# Patient Record
Sex: MALE | Race: WHITE | URBAN - METROPOLITAN AREA
[De-identification: names, ages, dates, MRNs, and addresses within clinical notes are randomized per-mention and may not be internally consistent; named-entity substitution may affect disease eponyms.]

---

## 2017-09-06 ENCOUNTER — ALLSCRIPTS OFFICE VISIT (OUTPATIENT)
Dept: OTHER | Facility: OTHER | Age: 32
End: 2017-09-06

## 2017-09-06 DIAGNOSIS — E78.5 HYPERLIPIDEMIA: ICD-10-CM

## 2017-10-25 ENCOUNTER — ALLSCRIPTS OFFICE VISIT (OUTPATIENT)
Dept: OTHER | Facility: OTHER | Age: 32
End: 2017-10-25

## 2017-10-26 NOTE — PROGRESS NOTES
Assessment  1  Benign hypertension (401 1) (I10)   2  Hyperlipidemia (272 4) (E78 5)    Plan  Benign hypertension    · Follow-up visit in 6 months Evaluation and Treatment  Follow-up  Status: Hold For - Scheduling   Requested for: [id number]    Discussion/Summary  Discussion Summary:   Pressure is controlled  Less urgency to get the blood work since he is off the cholesterol medicine  Instructed them again to call if he gets the insurance so we can do a full panel of blood work for his blood pressure  Otherwise, follow-up in 6 months  Counseling Documentation With Imm: The patient was counseled regarding instructions for management,-- impressions  Medication SE Review and Pt Understands Tx: Possible side effects of new medications were reviewed with the patient/guardian today  The treatment plan was reviewed with the patient/guardian  The patient/guardian understands and agrees with the treatment plan   Self Referrals:   Self Referrals: No      Chief Complaint  Chief Complaint Free Text Note Form: Patient returns today for a routine follow up  History of Present Illness  HPI: Patient was in today for follow-up  His blood pressure is controlled on the medication  Notes no side effects  His girlfriend reports that they're still waiting for his insurance to go through  He did see the neurologist and his cholesterol medicine and blood thinner were stopped  Continues to make progress with his rehabilitation  Was unable to get the blood work because of his lack of insurance  Review of Systems  Complete-Male:   Cardiovascular: no chest pain  Respiratory: no shortness of breath  Active Problems  1  Benign hypertension (401 1) (I10)   2  Hyperlipidemia (272 4) (E78 5)   3  Vertebral artery dissection (443 24) (I89 74)    Past Medical History  1  Denied: History of mental disorder  Active Problems And Past Medical History Reviewed:    The active problems and past medical history were reviewed and updated today       Surgical History  1  Denied: History Of Prior Surgery    Family History  Father    1  Family history of hyperlipidemia (V18 19) (Z83 49)    Social History   · Daily caffeine consumption   · Never smoker   · No illicit drug use   · Occasional alcohol use   · Single    Current Meds   1  AmLODIPine Besylate 5 MG Oral Tablet; TAKE 1 TABLET DAILY  Requested for: [id number]; Last   [id number] Ordered  Medication List Reviewed: The medication list was reviewed and updated today  Allergies  1  No Known Drug Allergies    Vitals  Vital Signs    Recorded: [id number] 06:03PM   Temperature 97 7 F   Heart Rate 73   Systolic 503   Diastolic 68   Height 6 ft 1 in   Weight 209 lb    BMI Calculated 27 57   BSA Calculated 2 19   O2 Saturation 97     Physical Exam    Constitutional   General appearance: No acute distress, well appearing and well nourished  Pulmonary   Respiratory effort: No increased work of breathing or signs of respiratory distress  Auscultation of lungs: Clear to auscultation, equal breath sounds bilaterally, no wheezes, no rales, no rhonci  Cardiovascular   Auscultation of heart: Normal rate and rhythm, normal S1 and S2, without murmurs      Examination of extremities for edema and/or varicosities: Normal     Psychiatric   Orientation to person, place and time: Normal     Mood and affect: Normal          Signatures   Electronically signed by : Annie Michelle MD; Oct 25 2017  6:22PM EST                       (Author)

## 2018-01-12 VITALS
BODY MASS INDEX: 27.77 KG/M2 | HEIGHT: 73 IN | OXYGEN SATURATION: 98 % | SYSTOLIC BLOOD PRESSURE: 122 MMHG | RESPIRATION RATE: 18 BRPM | HEART RATE: 86 BPM | WEIGHT: 209.5 LBS | DIASTOLIC BLOOD PRESSURE: 76 MMHG

## 2018-01-14 VITALS
SYSTOLIC BLOOD PRESSURE: 102 MMHG | DIASTOLIC BLOOD PRESSURE: 68 MMHG | WEIGHT: 209 LBS | OXYGEN SATURATION: 97 % | HEIGHT: 73 IN | BODY MASS INDEX: 27.7 KG/M2 | TEMPERATURE: 97.7 F | HEART RATE: 73 BPM

## 2023-02-10 ENCOUNTER — OFFICE VISIT (OUTPATIENT)
Dept: INTERNAL MEDICINE CLINIC | Facility: CLINIC | Age: 38
End: 2023-02-10

## 2023-02-10 VITALS
RESPIRATION RATE: 18 BRPM | OXYGEN SATURATION: 93 % | BODY MASS INDEX: 29.18 KG/M2 | HEART RATE: 96 BPM | HEIGHT: 73 IN | WEIGHT: 220.2 LBS | TEMPERATURE: 97.3 F | SYSTOLIC BLOOD PRESSURE: 126 MMHG | DIASTOLIC BLOOD PRESSURE: 84 MMHG

## 2023-02-10 DIAGNOSIS — Z11.4 SCREENING FOR HIV WITHOUT PRESENCE OF RISK FACTORS: ICD-10-CM

## 2023-02-10 DIAGNOSIS — F32.A DEPRESSION, UNSPECIFIED DEPRESSION TYPE: ICD-10-CM

## 2023-02-10 DIAGNOSIS — I10 PRIMARY HYPERTENSION: ICD-10-CM

## 2023-02-10 DIAGNOSIS — F51.04 PSYCHOPHYSIOLOGICAL INSOMNIA: ICD-10-CM

## 2023-02-10 DIAGNOSIS — F10.10 ALCOHOL ABUSE: ICD-10-CM

## 2023-02-10 DIAGNOSIS — Z11.59 ENCOUNTER FOR HEPATITIS C SCREENING TEST FOR LOW RISK PATIENT: ICD-10-CM

## 2023-02-10 DIAGNOSIS — Z76.89 ESTABLISHING CARE WITH NEW DOCTOR, ENCOUNTER FOR: Primary | ICD-10-CM

## 2023-02-10 DIAGNOSIS — E78.2 MIXED HYPERLIPIDEMIA: ICD-10-CM

## 2023-02-10 RX ORDER — ATENOLOL 50 MG/1
50 TABLET ORAL DAILY
COMMUNITY
End: 2023-02-10 | Stop reason: SDUPTHER

## 2023-02-10 RX ORDER — ATORVASTATIN CALCIUM 40 MG/1
40 TABLET, FILM COATED ORAL DAILY
Qty: 90 TABLET | Refills: 1 | Status: SHIPPED | OUTPATIENT
Start: 2023-02-10

## 2023-02-10 RX ORDER — ATORVASTATIN CALCIUM 40 MG/1
40 TABLET, FILM COATED ORAL DAILY
COMMUNITY
End: 2023-02-10 | Stop reason: SDUPTHER

## 2023-02-10 RX ORDER — ATENOLOL 50 MG/1
50 TABLET ORAL DAILY
Qty: 90 TABLET | Refills: 1 | Status: SHIPPED | OUTPATIENT
Start: 2023-02-10

## 2023-02-10 RX ORDER — AMLODIPINE BESYLATE 5 MG/1
5 TABLET ORAL DAILY
Qty: 90 TABLET | Refills: 1 | Status: SHIPPED | OUTPATIENT
Start: 2023-02-10

## 2023-02-10 RX ORDER — AMLODIPINE BESYLATE 5 MG/1
1 TABLET ORAL DAILY
COMMUNITY
End: 2023-02-10 | Stop reason: SDUPTHER

## 2023-02-10 RX ORDER — TRAZODONE HYDROCHLORIDE 50 MG/1
50 TABLET ORAL
Qty: 90 TABLET | Refills: 3 | Status: SHIPPED | OUTPATIENT
Start: 2023-02-10

## 2023-02-10 RX ORDER — ESCITALOPRAM OXALATE 5 MG/1
5 TABLET ORAL DAILY
Qty: 30 TABLET | Refills: 5 | Status: SHIPPED | OUTPATIENT
Start: 2023-02-10

## 2023-02-10 NOTE — PROGRESS NOTES
Assessment/Plan:     Jocelyne Najjar is here to establish care; he is not working and has a live in girlfriend; from [place]; has a [address] for h/o ? Cerebellar stroke about 4-5 years ago, HTN, HLD, and alcohol abuse  He requires medication refills  Reports symptoms of depression that started shortly after his stroke like difficulty sleeping and irritability; was on therapy in the past but it did not help; will try lexapro 5 mg and trazodone for sleep; f/u in 1 month  H/o alcohol abuse; he stopped cold turkey and had seizure; not interested in rehab or AA meetings  Quality Measures:     BMI Counseling: Body mass index is 29 05 kg/m²  The BMI is above normal  Nutrition recommendations include decreasing portion sizes and encouraging healthy choices of fruits and vegetables  Exercise recommendations include moderate physical activity 150 minutes/week  Rationale for BMI follow-up plan is due to patient being overweight or obese  Depression Screening and Follow-up Plan: Patient's depression screening was positive with a PHQ-2 score of 6  Their PHQ-9 score was 11  Patient assessed for underlying major depression  Brief counseling provided and recommend additional follow-up/re-evaluation next office visit  Return in about 4 weeks (around 3/10/2023) for Annual physical     No problem-specific Assessment & Plan notes found for this encounter  Diagnoses and all orders for this visit:    Establishing care with new doctor, encounter for    Primary hypertension  -     amLODIPine (NORVASC) 5 mg tablet; Take 1 tablet (5 mg total) by mouth daily  -     atenolol (TENORMIN) 50 mg tablet; Take 1 tablet (50 mg total) by mouth daily  -     CBC and differential; Future  -     Comprehensive metabolic panel; Future  -     TSH, 3rd generation with Free T4 reflex;  Future  -     CBC and differential  -     Comprehensive metabolic panel  -     TSH, 3rd generation with Free T4 reflex    Mixed hyperlipidemia  -     atorvastatin (LIPITOR) 40 mg tablet; Take 1 tablet (40 mg total) by mouth daily  -     Hemoglobin A1C; Future  -     Lipid Panel with Direct LDL reflex; Future  -     Lipid Panel with Direct LDL reflex    Depression, unspecified depression type  -     escitalopram (LEXAPRO) 5 mg tablet; Take 1 tablet (5 mg total) by mouth daily    Psychophysiological insomnia  -     traZODone (DESYREL) 50 mg tablet; Take 1 tablet (50 mg total) by mouth daily at bedtime    Screening for HIV without presence of risk factors  -     HIV-1 RNA, quantitative, PCR; Future  -     HIV-1 RNA, quantitative, PCR    Encounter for hepatitis C screening test for low risk patient  -     Hepatitis C antibody; Future    Alcohol abuse  -     Vitamin B12; Future  -     Folate; Future  -     Vitamin B12  -     Folate    Other orders  -     Discontinue: amLODIPine (NORVASC) 5 mg tablet; Take 1 tablet by mouth daily  -     Discontinue: atorvastatin (LIPITOR) 40 mg tablet; Take 40 mg by mouth daily  -     Discontinue: atenolol (TENORMIN) 50 mg tablet; Take 50 mg by mouth daily          Subjective:      Patient ID: Grover Burden is a 45 y o  male  Here to establish care        ALLERGIES:  No Known Allergies    CURRENT MEDICATIONS:    Current Outpatient Medications:   •  amLODIPine (NORVASC) 5 mg tablet, Take 1 tablet (5 mg total) by mouth daily, Disp: 90 tablet, Rfl: 1  •  atenolol (TENORMIN) 50 mg tablet, Take 1 tablet (50 mg total) by mouth daily, Disp: 90 tablet, Rfl: 1  •  atorvastatin (LIPITOR) 40 mg tablet, Take 1 tablet (40 mg total) by mouth daily, Disp: 90 tablet, Rfl: 1  •  escitalopram (LEXAPRO) 5 mg tablet, Take 1 tablet (5 mg total) by mouth daily, Disp: 30 tablet, Rfl: 5  •  traZODone (DESYREL) 50 mg tablet, Take 1 tablet (50 mg total) by mouth daily at bedtime, Disp: 90 tablet, Rfl: 3    ACTIVE PROBLEM LIST:  Patient Active Problem List   Diagnosis   • Mixed hyperlipidemia   • Depression   • Primary hypertension       PAST MEDICAL HISTORY:  Past Medical History:   Diagnosis Date   • Hyperlipidemia    • Hypertension    • Stroke [facility])        PAST SURGICAL HISTORY:  History reviewed  No pertinent surgical history  FAMILY HISTORY:  Family History   Problem Relation Age of Onset   • Lymphoma Mother    • Heart attack Mother    • Hypertension Mother    • Hypertension Father        SOCIAL HISTORY:  Social History     Socioeconomic History   • Marital status: Single     Spouse name: Not on file   • Number of children: Not on file   • Years of education: Not on file   • Highest education level: Not on file   Occupational History   • Not on file   Tobacco Use   • Smoking status: Never   • Smokeless tobacco: Current   Substance and Sexual Activity   • Alcohol use: Yes   • Drug use: Never   • Sexual activity: Not on file   Other Topics Concern   • Not on file   Social History Narrative   • Not on file     Social Determinants of Health     Financial Resource Strain: Not on file   Food Insecurity: Not on file   Transportation Needs: Not on file   Physical Activity: Not on file   Stress: Not on file   Social Connections: Not on file   Intimate Partner Violence: Not on file   Housing Stability: Not on file       Review of Systems   Psychiatric/Behavioral: Positive for decreased concentration, dysphoric mood and sleep disturbance  The patient is nervous/anxious  All other systems reviewed and are negative  Objective:  Vitals:    02/10/23 1411   BP: 126/84   BP Location: Right arm   Patient Position: Sitting   Cuff Size: Adult   Pulse: 96   Resp: 18   Temp: (!) 97 3 °F (36 3 °C)   TempSrc: Tympanic   SpO2: 93%   Weight: 99 9 kg (220 lb 3 2 oz)   Height: 6' 1" (1 854 m)     Body mass index is 29 05 kg/m²  Physical Exam  Vitals and nursing note reviewed  Constitutional:       Appearance: Normal appearance  HENT:      Head: Normocephalic and atraumatic  Right Ear: There is impacted cerumen  Left Ear: There is impacted cerumen     Cardiovascular: Rate and Rhythm: Normal rate and regular rhythm  Heart sounds: Normal heart sounds  Pulmonary:      Effort: Pulmonary effort is normal       Breath sounds: Normal breath sounds  Musculoskeletal:         General: Normal range of motion  Cervical back: Normal range of motion  Lymphadenopathy:      Cervical: No cervical adenopathy  Skin:     General: Skin is warm and dry  Neurological:      General: No focal deficit present  Mental Status: He is alert and oriented to person, place, and time  Mental status is at baseline  Gait: Gait abnormal       Comments: Wide based gait   Psychiatric:         Attention and Perception: Attention normal          Mood and Affect: Mood normal          Speech: Speech normal            RESULTS:    No results found for this or any previous visit (from the past 1008 hour(s))  This note was created with voice recognition software  Phonic, grammatical and spelling errors may be present within the note as a result

## 2023-02-10 NOTE — PROGRESS NOTES
Assessment/Plan:      Quality Measures:     BMI Counseling: Body mass index is 29 05 kg/m²  The BMI is above normal  Nutrition recommendations include decreasing portion sizes and encouraging healthy choices of fruits and vegetables  Exercise recommendations include moderate physical activity 150 minutes/week  Rationale for BMI follow-up plan is due to patient being overweight or obese  Depression Screening and Follow-up Plan: Patient assessed for underlying major depression  Brief counseling provided and recommend additional follow-up/re-evaluation next office visit  Return in about 6 months (around 8/10/2023) for Annual physical     No problem-specific Assessment & Plan notes found for this encounter  Diagnoses and all orders for this visit:    Establishing care with new doctor, encounter for    Primary hypertension  -     amLODIPine (NORVASC) 5 mg tablet; Take 1 tablet (5 mg total) by mouth daily  -     atenolol (TENORMIN) 50 mg tablet; Take 1 tablet (50 mg total) by mouth daily    Mixed hyperlipidemia  -     atorvastatin (LIPITOR) 40 mg tablet; Take 1 tablet (40 mg total) by mouth daily    Other orders  -     Discontinue: amLODIPine (NORVASC) 5 mg tablet; Take 1 tablet by mouth daily  -     Discontinue: atorvastatin (LIPITOR) 40 mg tablet; Take 40 mg by mouth daily  -     Discontinue: atenolol (TENORMIN) 50 mg tablet; Take 50 mg by mouth daily          Subjective:      Patient ID: Samantha Kumar is a 45 y o  male  Here to establish care        ALLERGIES:  No Known Allergies    CURRENT MEDICATIONS:    Current Outpatient Medications:   •  amLODIPine (NORVASC) 5 mg tablet, Take 1 tablet (5 mg total) by mouth daily, Disp: 90 tablet, Rfl: 1  •  atenolol (TENORMIN) 50 mg tablet, Take 1 tablet (50 mg total) by mouth daily, Disp: 90 tablet, Rfl: 1  •  atorvastatin (LIPITOR) 40 mg tablet, Take 1 tablet (40 mg total) by mouth daily, Disp: 90 tablet, Rfl: 1    ACTIVE PROBLEM LIST:  There is no problem list on file for this patient  PAST MEDICAL HISTORY:  Past Medical History:   Diagnosis Date   • Hyperlipidemia    • Hypertension    • Stroke [facility])        PAST SURGICAL HISTORY:  History reviewed  No pertinent surgical history  FAMILY HISTORY:  Family History   Problem Relation Age of Onset   • Lymphoma Mother    • Heart attack Mother    • Hypertension Mother    • Hypertension Father        SOCIAL HISTORY:  Social History     Socioeconomic History   • Marital status: Single     Spouse name: Not on file   • Number of children: Not on file   • Years of education: Not on file   • Highest education level: Not on file   Occupational History   • Not on file   Tobacco Use   • Smoking status: Never   • Smokeless tobacco: Current   Substance and Sexual Activity   • Alcohol use: Yes   • Drug use: Never   • Sexual activity: Not on file   Other Topics Concern   • Not on file   Social History Narrative   • Not on file     Social Determinants of Health     Financial Resource Strain: Not on file   Food Insecurity: Not on file   Transportation Needs: Not on file   Physical Activity: Not on file   Stress: Not on file   Social Connections: Not on file   Intimate Partner Violence: Not on file   Housing Stability: Not on file       Review of Systems   Psychiatric/Behavioral: Positive for decreased concentration, dysphoric mood and sleep disturbance  All other systems reviewed and are negative  Objective:  Vitals:    02/10/23 1411   BP: 126/84   BP Location: Right arm   Patient Position: Sitting   Cuff Size: Adult   Pulse: 96   Resp: 18   Temp: (!) 97 3 °F (36 3 °C)   TempSrc: Tympanic   SpO2: 93%   Weight: 99 9 kg (220 lb 3 2 oz)   Height: 6' 1" (1 854 m)     Body mass index is 29 05 kg/m²  Physical Exam  Vitals and nursing note reviewed  Constitutional:       Appearance: Normal appearance  HENT:      Head: Normocephalic and atraumatic  Right Ear: There is impacted cerumen  Left Ear:  There is impacted cerumen  Cardiovascular:      Rate and Rhythm: Normal rate and regular rhythm  Heart sounds: Normal heart sounds  Pulmonary:      Effort: Pulmonary effort is normal       Breath sounds: Normal breath sounds  Abdominal:      General: Bowel sounds are normal  There is distension  Palpations: Abdomen is soft  Musculoskeletal:         General: Normal range of motion  Cervical back: Normal range of motion  Lymphadenopathy:      Cervical: No cervical adenopathy  Skin:     General: Skin is warm and dry  Neurological:      General: No focal deficit present  Mental Status: He is alert and oriented to person, place, and time  Mental status is at baseline  Psychiatric:         Mood and Affect: Mood normal            RESULTS:    No results found for this or any previous visit (from the past 1008 hour(s))  This note was created with voice recognition software  Phonic, grammatical and spelling errors may be present within the note as a result

## 2023-02-24 ENCOUNTER — APPOINTMENT (OUTPATIENT)
Dept: LAB | Facility: HOSPITAL | Age: 38
End: 2023-02-24

## 2023-02-24 DIAGNOSIS — E78.2 MIXED HYPERLIPIDEMIA: Primary | ICD-10-CM

## 2023-02-24 DIAGNOSIS — Z11.59 ENCOUNTER FOR HEPATITIS C SCREENING TEST FOR LOW RISK PATIENT: ICD-10-CM

## 2023-02-24 LAB
ALBUMIN SERPL BCP-MCNC: 2.7 G/DL (ref 3.5–5)
ALP SERPL-CCNC: 139 U/L (ref 46–116)
ALT SERPL W P-5'-P-CCNC: 82 U/L (ref 12–78)
ANION GAP SERPL CALCULATED.3IONS-SCNC: 12 MMOL/L (ref 4–13)
AST SERPL W P-5'-P-CCNC: 157 U/L (ref 5–45)
BASOPHILS # BLD AUTO: 0.13 THOUSANDS/ÂΜL (ref 0–0.1)
BASOPHILS NFR BLD AUTO: 2 % (ref 0–1)
BILIRUB SERPL-MCNC: 7.52 MG/DL (ref 0.2–1)
BUN SERPL-MCNC: 3 MG/DL (ref 5–25)
CALCIUM ALBUM COR SERPL-MCNC: 9.3 MG/DL (ref 8.3–10.1)
CALCIUM SERPL-MCNC: 8.3 MG/DL (ref 8.3–10.1)
CHLORIDE SERPL-SCNC: 97 MMOL/L (ref 96–108)
CHOLEST SERPL-MCNC: 75 MG/DL
CO2 SERPL-SCNC: 23 MMOL/L (ref 21–32)
CREAT SERPL-MCNC: 0.99 MG/DL (ref 0.6–1.3)
EOSINOPHIL # BLD AUTO: 0.02 THOUSAND/ÂΜL (ref 0–0.61)
EOSINOPHIL NFR BLD AUTO: 0 % (ref 0–6)
ERYTHROCYTE [DISTWIDTH] IN BLOOD BY AUTOMATED COUNT: 18.5 % (ref 11.6–15.1)
FOLATE SERPL-MCNC: 5.3 NG/ML (ref 3.1–17.5)
GFR SERPL CREATININE-BSD FRML MDRD: 96 ML/MIN/1.73SQ M
GLUCOSE P FAST SERPL-MCNC: 102 MG/DL (ref 65–99)
HCT VFR BLD AUTO: 44.1 % (ref 36.5–49.3)
HCV AB SER QL: NORMAL
HDLC SERPL-MCNC: 20 MG/DL
HGB BLD-MCNC: 15.7 G/DL (ref 12–17)
IMM GRANULOCYTES # BLD AUTO: 0.04 THOUSAND/UL (ref 0–0.2)
IMM GRANULOCYTES NFR BLD AUTO: 1 % (ref 0–2)
LDLC SERPL CALC-MCNC: 41 MG/DL (ref 0–100)
LYMPHOCYTES # BLD AUTO: 1.44 THOUSANDS/ÂΜL (ref 0.6–4.47)
LYMPHOCYTES NFR BLD AUTO: 17 % (ref 14–44)
MCH RBC QN AUTO: 33.1 PG (ref 26.8–34.3)
MCHC RBC AUTO-ENTMCNC: 35.6 G/DL (ref 31.4–37.4)
MCV RBC AUTO: 93 FL (ref 82–98)
MONOCYTES # BLD AUTO: 1.04 THOUSAND/ÂΜL (ref 0.17–1.22)
MONOCYTES NFR BLD AUTO: 12 % (ref 4–12)
NEUTROPHILS # BLD AUTO: 5.8 THOUSANDS/ÂΜL (ref 1.85–7.62)
NEUTS SEG NFR BLD AUTO: 68 % (ref 43–75)
NRBC BLD AUTO-RTO: 0 /100 WBCS
PLATELET # BLD AUTO: 102 THOUSANDS/UL (ref 149–390)
PMV BLD AUTO: 10.9 FL (ref 8.9–12.7)
POTASSIUM SERPL-SCNC: 3.2 MMOL/L (ref 3.5–5.3)
PROT SERPL-MCNC: 8.3 G/DL (ref 6.4–8.4)
RBC # BLD AUTO: 4.74 MILLION/UL (ref 3.88–5.62)
SODIUM SERPL-SCNC: 132 MMOL/L (ref 135–147)
TRIGL SERPL-MCNC: 72 MG/DL
TSH SERPL DL<=0.05 MIU/L-ACNC: 2.55 UIU/ML (ref 0.45–4.5)
VIT B12 SERPL-MCNC: 4579 PG/ML (ref 100–900)
WBC # BLD AUTO: 8.47 THOUSAND/UL (ref 4.31–10.16)

## 2023-02-25 LAB
EST. AVERAGE GLUCOSE BLD GHB EST-MCNC: 85 MG/DL
HBA1C MFR BLD: 4.6 %

## 2023-02-26 LAB
HIV1 RNA # SERPL NAA+PROBE: <20 COPIES/ML
HIV1 RNA SERPL NAA+PROBE-LOG#: NORMAL LOG10COPY/ML

## 2023-03-03 ENCOUNTER — APPOINTMENT (EMERGENCY)
Dept: CT IMAGING | Facility: HOSPITAL | Age: 38
End: 2023-03-03

## 2023-03-03 ENCOUNTER — HOSPITAL ENCOUNTER (INPATIENT)
Facility: HOSPITAL | Age: 38
LOS: 3 days | Discharge: HOME | End: 2023-03-06
Attending: EMERGENCY MEDICINE | Admitting: INTERNAL MEDICINE

## 2023-03-03 ENCOUNTER — APPOINTMENT (OUTPATIENT)
Dept: NON INVASIVE DIAGNOSTICS | Facility: HOSPITAL | Age: 38
End: 2023-03-03
Attending: INTERNAL MEDICINE

## 2023-03-03 DIAGNOSIS — K74.60 CIRRHOSIS (HCC): ICD-10-CM

## 2023-03-03 DIAGNOSIS — K72.90 DECOMPENSATED HEPATIC CIRRHOSIS (HCC): ICD-10-CM

## 2023-03-03 DIAGNOSIS — E87.1 HYPONATREMIA: ICD-10-CM

## 2023-03-03 DIAGNOSIS — F10.20 ALCOHOLISM (HCC): ICD-10-CM

## 2023-03-03 DIAGNOSIS — R18.8 ASCITES: Primary | ICD-10-CM

## 2023-03-03 DIAGNOSIS — E87.6 HYPOKALEMIA: ICD-10-CM

## 2023-03-03 DIAGNOSIS — K74.60 DECOMPENSATED HEPATIC CIRRHOSIS (HCC): ICD-10-CM

## 2023-03-03 LAB
ALBUMIN FLD-MCNC: <0.6 G/DL
ALBUMIN SERPL BCP-MCNC: 3.1 G/DL (ref 3.5–5)
ALP SERPL-CCNC: 108 U/L (ref 34–104)
ALT SERPL W P-5'-P-CCNC: 49 U/L (ref 7–52)
ANION GAP SERPL CALCULATED.3IONS-SCNC: 11 MMOL/L (ref 4–13)
APTT PPP: 38 SECONDS (ref 23–37)
AST SERPL W P-5'-P-CCNC: 82 U/L (ref 13–39)
ATRIAL RATE: 76 BPM
BASOPHILS # BLD AUTO: 0.09 THOUSANDS/ÂΜL (ref 0–0.1)
BASOPHILS NFR BLD AUTO: 1 % (ref 0–1)
BILIRUB SERPL-MCNC: 6.43 MG/DL (ref 0.2–1)
BUN SERPL-MCNC: 7 MG/DL (ref 5–25)
CALCIUM ALBUM COR SERPL-MCNC: 9.5 MG/DL (ref 8.3–10.1)
CALCIUM SERPL-MCNC: 8.8 MG/DL (ref 8.4–10.2)
CHLORIDE SERPL-SCNC: 97 MMOL/L (ref 96–108)
CO2 SERPL-SCNC: 20 MMOL/L (ref 21–32)
CREAT SERPL-MCNC: 0.87 MG/DL (ref 0.6–1.3)
EOSINOPHIL # BLD AUTO: 0.02 THOUSAND/ÂΜL (ref 0–0.61)
EOSINOPHIL NFR BLD AUTO: 0 % (ref 0–6)
ERYTHROCYTE [DISTWIDTH] IN BLOOD BY AUTOMATED COUNT: 17.7 % (ref 11.6–15.1)
EST. AVERAGE GLUCOSE BLD GHB EST-MCNC: 88 MG/DL
GFR SERPL CREATININE-BSD FRML MDRD: 109 ML/MIN/1.73SQ M
GLUCOSE FLD-MCNC: 121 MG/DL
GLUCOSE SERPL-MCNC: 126 MG/DL (ref 65–140)
HBA1C MFR BLD: 4.7 %
HCT VFR BLD AUTO: 44.1 % (ref 36.5–49.3)
HGB BLD-MCNC: 16.1 G/DL (ref 12–17)
HISTIOCYTES NFR FLD: 53 %
IMM GRANULOCYTES # BLD AUTO: 0.02 THOUSAND/UL (ref 0–0.2)
IMM GRANULOCYTES NFR BLD AUTO: 0 % (ref 0–2)
INR PPP: 1.8 (ref 0.84–1.19)
LDH FLD L TO P-CCNC: 63 U/L
LIPASE SERPL-CCNC: 188 U/L (ref 11–82)
LYMPHOCYTES # BLD AUTO: 1.29 THOUSANDS/ÂΜL (ref 0.6–4.47)
LYMPHOCYTES NFR BLD AUTO: 16 % (ref 14–44)
LYMPHOCYTES NFR BLD AUTO: 19 %
MAGNESIUM SERPL-MCNC: 1.9 MG/DL (ref 1.9–2.7)
MCH RBC QN AUTO: 33.9 PG (ref 26.8–34.3)
MCHC RBC AUTO-ENTMCNC: 36.5 G/DL (ref 31.4–37.4)
MCV RBC AUTO: 93 FL (ref 82–98)
MONO+MESO NFR FLD MANUAL: 3 %
MONOCYTES # BLD AUTO: 0.91 THOUSAND/ÂΜL (ref 0.17–1.22)
MONOCYTES NFR BLD AUTO: 11 % (ref 4–12)
MONOCYTES NFR BLD AUTO: 23 %
NEUTROPHILS # BLD AUTO: 5.69 THOUSANDS/ÂΜL (ref 1.85–7.62)
NEUTS SEG NFR BLD AUTO: 2 %
NEUTS SEG NFR BLD AUTO: 72 % (ref 43–75)
NRBC BLD AUTO-RTO: 0 /100 WBCS
P AXIS: 53 DEGREES
PLATELET # BLD AUTO: 136 THOUSANDS/UL (ref 149–390)
PMV BLD AUTO: 11 FL (ref 8.9–12.7)
POTASSIUM SERPL-SCNC: 2.9 MMOL/L (ref 3.5–5.3)
PR INTERVAL: 146 MS
PROT FLD-MCNC: <2 G/DL
PROT SERPL-MCNC: 8.5 G/DL (ref 6.4–8.4)
PROTHROMBIN TIME: 20.5 SECONDS (ref 11.6–14.5)
QRS AXIS: 13 DEGREES
QRSD INTERVAL: 106 MS
QT INTERVAL: 444 MS
QTC INTERVAL: 499 MS
RBC # BLD AUTO: 4.75 MILLION/UL (ref 3.88–5.62)
SODIUM 24H UR-SCNC: <10 MOL/L
SODIUM SERPL-SCNC: 128 MMOL/L (ref 135–147)
T WAVE AXIS: 62 DEGREES
TOTAL CELLS COUNTED SPEC: 100
VENTRICULAR RATE: 76 BPM
WBC # BLD AUTO: 8.02 THOUSAND/UL (ref 4.31–10.16)
WBC # FLD MANUAL: 64 /UL

## 2023-03-03 PROCEDURE — 0W9G3ZZ DRAINAGE OF PERITONEAL CAVITY, PERCUTANEOUS APPROACH: ICD-10-PCS | Performed by: INTERNAL MEDICINE

## 2023-03-03 RX ORDER — POTASSIUM CHLORIDE 14.9 MG/ML
20 INJECTION INTRAVENOUS
Status: COMPLETED | OUTPATIENT
Start: 2023-03-03 | End: 2023-03-03

## 2023-03-03 RX ORDER — POTASSIUM CHLORIDE 20 MEQ/1
40 TABLET, EXTENDED RELEASE ORAL 2 TIMES DAILY
Status: DISCONTINUED | OUTPATIENT
Start: 2023-03-03 | End: 2023-03-06

## 2023-03-03 RX ORDER — ACETAMINOPHEN 325 MG/1
650 TABLET ORAL EVERY 6 HOURS PRN
Status: DISCONTINUED | OUTPATIENT
Start: 2023-03-03 | End: 2023-03-06 | Stop reason: HOSPADM

## 2023-03-03 RX ORDER — ATORVASTATIN CALCIUM 40 MG/1
40 TABLET, FILM COATED ORAL DAILY
Status: DISCONTINUED | OUTPATIENT
Start: 2023-03-03 | End: 2023-03-06 | Stop reason: HOSPADM

## 2023-03-03 RX ORDER — POTASSIUM CHLORIDE 20 MEQ/1
40 TABLET, EXTENDED RELEASE ORAL ONCE
Status: COMPLETED | OUTPATIENT
Start: 2023-03-03 | End: 2023-03-03

## 2023-03-03 RX ORDER — FOLIC ACID 1 MG/1
1 TABLET ORAL DAILY
Status: DISCONTINUED | OUTPATIENT
Start: 2023-03-03 | End: 2023-03-06 | Stop reason: HOSPADM

## 2023-03-03 RX ORDER — ESCITALOPRAM OXALATE 10 MG/1
5 TABLET ORAL DAILY
Status: DISCONTINUED | OUTPATIENT
Start: 2023-03-03 | End: 2023-03-06 | Stop reason: HOSPADM

## 2023-03-03 RX ORDER — PREDNISOLONE SODIUM PHOSPHATE 15 MG/5ML
40 SOLUTION ORAL DAILY
Status: DISCONTINUED | OUTPATIENT
Start: 2023-03-03 | End: 2023-03-06 | Stop reason: HOSPADM

## 2023-03-03 RX ORDER — TRAZODONE HYDROCHLORIDE 50 MG/1
50 TABLET ORAL
Status: DISCONTINUED | OUTPATIENT
Start: 2023-03-03 | End: 2023-03-06 | Stop reason: HOSPADM

## 2023-03-03 RX ORDER — POTASSIUM CHLORIDE 29.8 MG/ML
40 INJECTION INTRAVENOUS ONCE
Status: DISCONTINUED | OUTPATIENT
Start: 2023-03-03 | End: 2023-03-03

## 2023-03-03 RX ORDER — LORAZEPAM 1 MG/1
1 TABLET ORAL ONCE
Status: COMPLETED | OUTPATIENT
Start: 2023-03-03 | End: 2023-03-03

## 2023-03-03 RX ORDER — LANOLIN ALCOHOL/MO/W.PET/CERES
100 CREAM (GRAM) TOPICAL DAILY
Status: DISCONTINUED | OUTPATIENT
Start: 2023-03-03 | End: 2023-03-06 | Stop reason: HOSPADM

## 2023-03-03 RX ORDER — NICOTINE 21 MG/24HR
1 PATCH, TRANSDERMAL 24 HOURS TRANSDERMAL DAILY
Status: DISCONTINUED | OUTPATIENT
Start: 2023-03-03 | End: 2023-03-06 | Stop reason: HOSPADM

## 2023-03-03 RX ORDER — HEPARIN SODIUM 5000 [USP'U]/ML
5000 INJECTION, SOLUTION INTRAVENOUS; SUBCUTANEOUS EVERY 8 HOURS SCHEDULED
Status: DISCONTINUED | OUTPATIENT
Start: 2023-03-03 | End: 2023-03-06 | Stop reason: HOSPADM

## 2023-03-03 RX ORDER — LORAZEPAM 1 MG/1
2 TABLET ORAL ONCE
Status: COMPLETED | OUTPATIENT
Start: 2023-03-03 | End: 2023-03-03

## 2023-03-03 RX ORDER — ONDANSETRON 2 MG/ML
4 INJECTION INTRAMUSCULAR; INTRAVENOUS EVERY 6 HOURS PRN
Status: DISCONTINUED | OUTPATIENT
Start: 2023-03-03 | End: 2023-03-06 | Stop reason: HOSPADM

## 2023-03-03 RX ORDER — ATENOLOL 50 MG/1
50 TABLET ORAL DAILY
Status: DISCONTINUED | OUTPATIENT
Start: 2023-03-03 | End: 2023-03-04

## 2023-03-03 RX ORDER — LIDOCAINE HYDROCHLORIDE 10 MG/ML
INJECTION, SOLUTION EPIDURAL; INFILTRATION; INTRACAUDAL; PERINEURAL AS NEEDED
Status: COMPLETED | OUTPATIENT
Start: 2023-03-03 | End: 2023-03-03

## 2023-03-03 RX ORDER — AMLODIPINE BESYLATE 5 MG/1
5 TABLET ORAL DAILY
Status: DISCONTINUED | OUTPATIENT
Start: 2023-03-03 | End: 2023-03-04

## 2023-03-03 RX ADMIN — POTASSIUM CHLORIDE 20 MEQ: 14.9 INJECTION, SOLUTION INTRAVENOUS at 16:01

## 2023-03-03 RX ADMIN — Medication 1 TABLET: at 17:22

## 2023-03-03 RX ADMIN — FOLIC ACID 1 MG: 1 TABLET ORAL at 17:21

## 2023-03-03 RX ADMIN — TRAZODONE HYDROCHLORIDE 50 MG: 50 TABLET ORAL at 22:22

## 2023-03-03 RX ADMIN — ATORVASTATIN CALCIUM 40 MG: 40 TABLET, FILM COATED ORAL at 17:21

## 2023-03-03 RX ADMIN — POTASSIUM CHLORIDE 20 MEQ: 14.9 INJECTION, SOLUTION INTRAVENOUS at 13:03

## 2023-03-03 RX ADMIN — LIDOCAINE HYDROCHLORIDE 5 ML: 10 INJECTION, SOLUTION EPIDURAL; INFILTRATION; INTRACAUDAL; PERINEURAL at 14:33

## 2023-03-03 RX ADMIN — LORAZEPAM 1 MG: 1 TABLET ORAL at 11:35

## 2023-03-03 RX ADMIN — ESCITALOPRAM OXALATE 5 MG: 10 TABLET ORAL at 17:21

## 2023-03-03 RX ADMIN — ATENOLOL 50 MG: 50 TABLET ORAL at 17:22

## 2023-03-03 RX ADMIN — HEPARIN SODIUM 5000 UNITS: 5000 INJECTION INTRAVENOUS; SUBCUTANEOUS at 22:22

## 2023-03-03 RX ADMIN — AMLODIPINE BESYLATE 5 MG: 5 TABLET ORAL at 17:22

## 2023-03-03 RX ADMIN — IOHEXOL 100 ML: 350 INJECTION, SOLUTION INTRAVENOUS at 12:34

## 2023-03-03 RX ADMIN — THIAMINE HCL TAB 100 MG 100 MG: 100 TAB at 17:21

## 2023-03-03 RX ADMIN — POTASSIUM CHLORIDE 40 MEQ: 1500 TABLET, EXTENDED RELEASE ORAL at 12:57

## 2023-03-03 RX ADMIN — POTASSIUM CHLORIDE 40 MEQ: 1500 TABLET, EXTENDED RELEASE ORAL at 17:21

## 2023-03-03 RX ADMIN — LORAZEPAM 2 MG: 1 TABLET ORAL at 18:36

## 2023-03-03 RX ADMIN — HEPARIN SODIUM 5000 UNITS: 5000 INJECTION INTRAVENOUS; SUBCUTANEOUS at 17:22

## 2023-03-03 NOTE — ED PROVIDER NOTES
History  Chief Complaint   Patient presents with   • Abnormal Lab     Pt states his pcp called him to come here for CT of his gall bladder, based on his blood work results from last week  Patient is a 51-year-old male past medical history of hyperlipidemia, hypertension, CVA, alcohol abuse presenting with abnormal labs  Patient had labs drawn 2/24 which showed mildly elevated LFTs and elevated bilirubin to 7  Patient states that his PCP told him to come in to get a CT of his gallbladder  Patient notes lower abdominal pain for the last week which has been coming and going, associated with diarrhea and intermittent vomiting, nonbloody  He states it is worse at night he has never had before does not take any medication for it  Denies any change to his baseline dizziness  Denies any fevers, urinary symptoms, chest pain, shortness of breath, rashes, vision changes  States that his last drink was 2 days ago and he has been trying to wean himself off alcohol  Has a history of withdrawal seizures  States he is having shakes currently  Prior to Admission Medications   Prescriptions Last Dose Informant Patient Reported? Taking? amLODIPine (NORVASC) 5 mg tablet   No No   Sig: Take 1 tablet (5 mg total) by mouth daily   atenolol (TENORMIN) 50 mg tablet   No No   Sig: Take 1 tablet (50 mg total) by mouth daily   atorvastatin (LIPITOR) 40 mg tablet   No No   Sig: Take 1 tablet (40 mg total) by mouth daily   escitalopram (LEXAPRO) 5 mg tablet   No No   Sig: Take 1 tablet (5 mg total) by mouth daily   traZODone (DESYREL) 50 mg tablet   No No   Sig: Take 1 tablet (50 mg total) by mouth daily at bedtime      Facility-Administered Medications: None       Past Medical History:   Diagnosis Date   • Hyperlipidemia    • Hypertension    • Stroke ([facility], [place] [address] )        No past surgical history on file      Family History   Problem Relation Age of Onset   • Lymphoma Mother    • Heart attack Mother    • Hypertension Mother    • Hypertension Father      I have reviewed and agree with the history as documented  E-Cigarette/Vaping     E-Cigarette/Vaping Substances     Social History     Tobacco Use   • Smoking status: Never   • Smokeless tobacco: Current   Substance Use Topics   • Alcohol use: Yes     Alcohol/week: 6 0 standard drinks     Types: 6 Cans of beer per week   • Drug use: Never       Review of Systems   All other systems reviewed and are negative  Physical Exam  Physical Exam  Vitals reviewed  Constitutional:       General: He is not in acute distress  Appearance: Normal appearance  He is not ill-appearing  HENT:      Mouth/Throat:      Mouth: Mucous membranes are moist    Eyes:      General: Scleral icterus present  Conjunctiva/sclera: Conjunctivae normal    Cardiovascular:      Rate and Rhythm: Normal rate and regular rhythm  Heart sounds: Normal heart sounds  Pulmonary:      Effort: Pulmonary effort is normal       Breath sounds: Normal breath sounds  Abdominal:      General: Abdomen is flat  There is distension  Palpations: Abdomen is soft  Tenderness: There is abdominal tenderness  Comments: Mild generalized tenderness without guarding   Musculoskeletal:         General: No swelling  Normal range of motion  Cervical back: Neck supple  Skin:     General: Skin is warm and dry  Neurological:      General: No focal deficit present  Mental Status: He is alert        Coordination: Coordination normal       Comments: Mild diffuse tremor   Psychiatric:         Mood and Affect: Mood normal          Vital Signs  ED Triage Vitals [03/03/23 1111]   Temperature Pulse Respirations Blood Pressure SpO2   98 3 °F (36 8 °C) 88 17 114/65 95 %      Temp Source Heart Rate Source Patient Position - Orthostatic VS BP Location FiO2 (%)   Temporal Monitor Sitting Left arm --      Pain Score       --           Vitals:    03/03/23 1111   BP: 114/65   Pulse: 88   Patient Position - Orthostatic VS: Sitting         Visual Acuity      ED Medications  Medications   LORazepam (ATIVAN) tablet 1 mg (has no administration in time range)       Diagnostic Studies  Results Reviewed     None                 No orders to display              Procedures  ECG 12 Lead Documentation Only    Date/Time: 3/3/2023 12:02 PM  Performed by: Graeme Hutchins DO  Authorized by: Graeme Hutchins DO     Patient location:  ED  Previous ECG:     Previous ECG:  Unavailable  Interpretation:     Interpretation: non-specific    Rate:     ECG rate assessment: normal    Rhythm:     Rhythm: sinus rhythm    Ectopy:     Ectopy: none    QRS:     QRS axis:  Normal    QRS intervals:  Normal  Conduction:     Conduction: normal    ST segments:     ST segments:  Normal  T waves:     T waves: non-specific               ED Course  ED Course as of 03/03/23 1942   Fri Mar 03, 2023   1232 Patient hypokalemic, will replete   1327 Patient with ascites on CT, hyponatremia, hypokalemia, will admit for further management  Medical Decision Making  Patient is a 80-year-old male past medical history of hypertension, hyperlipidemia, alcohol use disorder, CVA presenting with elevated bilirubin, abdominal pain  Patient is well-appearing at bedside with stable vitals and in no acute distress  He has diffuse mild tremor and mild scleral icterus with mild lower abdominal tenderness but no other significant physical exam findings  Does not appear to be in severe withdrawal however have offered benzodiazepine for withdrawal patient states that he would except medication at this time  Will place on CIWA protocol continue continue to monitor for withdrawal, patient's last PCP note states that he is not interested in alcohol rehabilitation at this time  Will obtain labs, CT abdomen pelvis to rule out pancreatitis, cholecystitis, hepatitis, small bowel obstruction, gastroenteritis      Amount and/or Complexity of Data Reviewed  Labs: ordered  Radiology: ordered  Risk  Prescription drug management  Disposition  Final diagnoses:   None     ED Disposition     None      Follow-up Information    None         Patient's Medications   Discharge Prescriptions    No medications on file       No discharge procedures on file      PDMP Review     None          ED Provider  Electronically Signed by           Pia Litten, DO  03/03/23 [id number]

## 2023-03-03 NOTE — CONSULTS
Consultation -  [facility]  Adeline Paiz 45 y o  male MRN: [id number]  Unit/Bed#: FT 03 Encounter: [id number]         Reason for Consult / Principal Problem: Newly diagnosed cirrhosis, likely alcohol induced    HPI: Adeline Kirkland is a 45year old male with history of CVA 5 years ago not on anticoagulation, alcohol abuse, hypertension, lipidemia and tobacco dependence  Patient presented to the emergency room for abnormal outpatient labs by instruction of his PCP  Patient reports that he also noted that his abdomen has become more bloated since November last year  He denies signs or GI bleeding, nausea or vomiting  Reports that he has been drinking a sixpack of beer per day, and has been drinking since he was a teenager  To his knowledge, there is no family history of liver disease or malignancy  Patient's mother-in-law is at the bedside, and the patient reports that we can speak freely in front of her  On admission, LFTs are as follows: AST 82, ALT 49, alkaline phosphatase 108, albumin 3 1, total bilirubin 6 43  Hemoglobin 16, platelet count 763,962  PT 20 5, INR 1 8  CT revealing cirrhosis with moderate ascites  The liver appears to be diffusely heterogeneous with multiple hypoattenuating nodular foci  In addition, the transverse colon appears to be thickened likely secondary to portal hypertension  The gallbladder appears distended without gallstones  Patient reports that this is the first time he is being told that he has cirrhosis  He has never had an EGD or colonoscopy  Review of Systems:    CONSTITUTIONAL: Denies any fever, chills, or rigors  Good appetite, and no recent weight loss  HEENT: No earache or tinnitus  Denies hearing loss or visual disturbances  CARDIOVASCULAR: No chest pain or palpitations  RESPIRATORY: Denies any cough, hemoptysis, shortness of breath or dyspnea on exertion  GASTROINTESTINAL: As noted in the History of Present Illness     GENITOURINARY: No problems with urination  Denies any hematuria or dysuria  NEUROLOGIC: No dizziness or vertigo, denies headaches  MUSCULOSKELETAL: Denies any muscle or joint pain  SKIN: Denies skin rashes or itching  ENDOCRINE: Denies excessive thirst  Denies intolerance to heat or cold  PSYCHOSOCIAL: Denies depression or anxiety  Denies any recent memory loss  Historical Information   Past Medical History:   Diagnosis Date   • Hyperlipidemia    • Hypertension    • Stroke ([address] )      No past surgical history on file  Social History   Social History     Substance and Sexual Activity   Alcohol Use Yes   • Alcohol/week: 6 0 standard drinks   • Types: 6 Cans of beer per week     Social History     Substance and Sexual Activity   Drug Use Never     Social History     Tobacco Use   Smoking Status Never   Smokeless Tobacco Current     Family History   Problem Relation Age of Onset   • Lymphoma Mother    • Heart attack Mother    • Hypertension Mother    • Hypertension Father         Meds/Allergies     Current Facility-Administered Medications   Medication Dose Route Frequency   • acetaminophen (TYLENOL) tablet 650 mg  650 mg Oral Q6H PRN   • amLODIPine (NORVASC) tablet 5 mg  5 mg Oral Daily   • atenolol (TENORMIN) tablet 50 mg  50 mg Oral Daily   • atorvastatin (LIPITOR) tablet 40 mg  40 mg Oral Daily   • escitalopram (LEXAPRO) tablet 5 mg  5 mg Oral Daily   • heparin (porcine) subcutaneous injection 5,000 Units  5,000 Units Subcutaneous Q8H [facility]   • lidocaine (PF) (XYLOCAINE-MPF) 1 % injection    PRN   • nicotine (NICODERM CQ) 21 mg/24 hr TD 24 hr patch 1 patch  1 patch Transdermal Daily   • ondansetron (ZOFRAN) injection 4 mg  4 mg Intravenous Q6H PRN   • potassium chloride 20 mEq IVPB (premix)  20 mEq Intravenous Q2H   • traZODone (DESYREL) tablet 50 mg  50 mg Oral HS       No Known Allergies      Objective     Blood pressure 112/70, pulse 72, temperature 98 3 °F (36 8 °C), temperature source Temporal, resp   rate 18, SpO2 96 %     No intake or output data in the 24 hours ending 03/03/23 1441      PHYSICAL EXAM:      General Appearance:   Alert and oriented x 3  Cooperative, and in no respiratory distress   HEENT:   Normocephalic, atraumatic, anicteric      Neck:  Supple, symmetrical, trachea midline   Lungs:   Clear to auscultation bilaterally; no rales, rhonchi or wheezing; respirations unlabored    Heart[de-identified]   S1 and S2 normal; regular rate and rhythm; no murmur, rub, or gallop  Abdomen:   Soft, non-tender, moderately distended; normal bowel sounds; no masses, no organomegaly  Clear, yellow ascitic fluid being drained by IR during my encounter   Genitalia:   Deferred    Rectal:   Deferred    Extremities:  No cyanosis, clubbing or edema    Pulses:  2+ and symmetric all extremities    Skin:  Skin color, texture, turgor normal, no rashes or lesions    Lymph nodes:  No palpable cervical or supraclavicular lymphadenopathy        Lab Results:   Results from last 7 days   Lab Units 03/03/23  1154   WBC Thousand/uL 8 02   HEMOGLOBIN g/dL 16 1   HEMATOCRIT % 44 1   PLATELETS Thousands/uL 136*   NEUTROS PCT % 72   LYMPHS PCT % 16   MONOS PCT % 11   EOS PCT % 0     Results from last 7 days   Lab Units 03/03/23  1154   POTASSIUM mmol/L 2 9*   CHLORIDE mmol/L 97   CO2 mmol/L 20*   BUN mg/dL 7   CREATININE mg/dL 0 87   CALCIUM mg/dL 8 8   ALK PHOS U/L 108*   ALT U/L 49   AST U/L 82*     Results from last 7 days   Lab Units 03/03/23  1154   INR  1 80*     Results from last 7 days   Lab Units 03/03/23  1154   LIPASE u/L 188*       Imaging Studies: I have personally reviewed pertinent imaging studies  CT abdomen pelvis with contrast    Result Date: 3/3/2023  Impression: 1  Cirrhosis with moderate ascites  The liver is diffusely heterogeneous with multiple hypoattenuating nodular foci  Correlation with AFP is recommended  If there is clinical concern, a follow-up nonemergent MRI can be obtained to evaluate for discrete lesions   2   Mild small small bowel and right/transverse colonic wall thickening, likely related to portal hypertension  3   Distended gallbladder  No stones are visualized and evaluation of the gallbladder wall is limited by surrounding ascites  If there is clinical concern for acute cholecystitis, consider follow-up ultrasound  The study was marked in [facility] for immediate notification  Workstation performed: [id number]       ASSESSMENT and PLAN:      1 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 complicated by ascites, thrombocytopenia, hyponatremia- MELD 26  DF 38  Little over 1 L moved via paracentesis at the bedside  Asked our IR team to avoid a large volume paracentesis  Patient is on encephalopathic on exam   Patient reports that this is the first time he has been told that he has cirrhosis  - CMP and INR daily  - Full liver work-up ordered  - Low-sodium diet under 2 g daily limit  - CIWAA protocol, multivitamin, thiamine and folate  - Fluid analysis from ascites, rule out SBP  - Patient will need an eventual EGD to rule out esophageal varices and other changes relating to portal hypertension  - Complete abstinence from alcohol discussed with the patient and family at the bedside to prevent further decompensation of liver disease, he voices understanding  - If SBP is ruled out along with resolution of hyponatremia, would start on low-dose diuretics  - If patient has changes in mental status, worsening coags or new VISHAL, would warrant liver transplant evaluation   - We will need close outpatient follow-up  - Discussed with Dr Alyson Holloway on the primary team    2) Hypoattenutating foci of liver - Noted on CT   - We will order MRI liver to investigate, check AFP    The patient was seen and examined by Dr Jacques Chavez, all guaman medical decisions were made with Dr Jacques Chavez  Thank you for allowing us to participate in the care of this pleasant patient  We will follow up with you closely

## 2023-03-03 NOTE — PLAN OF CARE
Problem: INFECTION - ADULT  Goal: Absence or prevention of progression during hospitalization  Description: INTERVENTIONS:  - Assess and monitor for signs and symptoms of infection  - Monitor lab/diagnostic results  - Monitor all insertion sites, i e  indwelling lines, tubes, and drains  - Monitor endotracheal if appropriate and nasal secretions for changes in amount and color  - [place] appropriate cooling/warming therapies per order  - Administer medications as ordered  - Instruct and encourage patient and family to use good hand hygiene technique  - Identify and instruct in appropriate isolation precautions for identified infection/condition  Outcome: Progressing

## 2023-03-03 NOTE — H&P
[address] [place] [address]  H&P- Veronique Lawson 1985, 45 y o  male MRN: [id number]  Unit/Bed#: FT 03 Encounter: [id number]  Primary Care Provider: Simon Echeverria MD   Date and time admitted to hospital: 3/3/2023 11:18 AM    * Decompensated hepatic cirrhosis ([facility] )  Assessment & Plan  The patient presented with abnormal blood work compatible with cirrhosis including thrombocytopenia, elevated bilirubin, elevated INR  In the setting of alcoholic cirrhosis  Also appears to have ascites on imaging  MELD 20    · I have ordered IR paracentesis for evaluation of the fluid   · GI will be consulted - input will be appreciated  · Monitor CMP, abdominal exam      Alcoholism ([place] [address] )  Assessment & Plan  · Patient does endorse alcoholism  · I will keep patient on CIWA protocol    Hypokalemia  Assessment & Plan  · Replace potassium  · Recheck, check magnesium    Hyponatremia  Assessment & Plan  · Sodium noted to be 128 on admission  · The hyponatremia somewhat chronic in nature although sodium is slightly below baseline  · Likely in the setting of cirrhosis  · Check urine studies, check osmolality  · Monitor BMP    Primary hypertension  Assessment & Plan  · Continue atenolol and amlodipine  · Monitor blood pressure    Depression  Assessment & Plan  · Continue Lexapro    Mixed hyperlipidemia  Assessment & Plan  · Continue atorvastatin    VTE Prophylaxis: Heparin  / sequential compression device   Code Status: Full code  POLST: POLST form is not discussed and not completed at this time  Discussion with family: Discussed with patient    Anticipated Length of Stay:  Patient will be admitted on an Inpatient basis with an anticipated length of stay of at least 2 midnights     Justification for Hospital Stay: Decompensated cirrhosis with ascites    Total Time for Medical Decision Making including Record Review,  Physical Encounter and Physical Exam, Elaboration of Assessment and Plan,  Counseling / Coordination of Care: 75 minutes    Chief Complaint:   Abnormal lab work    History of Present Illness:    Juan Pablo Fuller is a 45 y o  male who presents with worsening abdominal distention and also abnormal blood work in the outpatient setting  The patient presented with hyponatremia, hypokalemia, increased bilirubin at 6 4, alk phos is also elevated but only mildly at 100, he is thrombocytopenic with platelets of 842  MELD-Na score: 26 at 3/3/2023 11:54 AM  MELD score: 20 at 3/3/2023 11:54 AM  Calculated from:  Serum Creatinine: 0 87 mg/dL (Using min of 1 mg/dL) at 3/3/2023 11:54 AM  Serum Sodium: 128 mmol/L at 3/3/2023 11:54 AM  Total Bilirubin: 6 43 mg/dL at 3/3/2023 11:54 AM  INR(ratio): 1 80 at 3/3/2023 11:54 AM  Age: 38 years    Patient's MELD Score is: 26      Review of Systems:    Review of Systems   Constitutional: Positive for fatigue  Gastrointestinal: Positive for abdominal distention  All other systems reviewed and are negative  Past Medical and Surgical History:     Past Medical History:   Diagnosis Date   • Hyperlipidemia    • Hypertension    • Stroke ([facility] [address] )        No past surgical history on file  Meds/Allergies:    Prior to Admission medications    Medication Sig Start Date End Date Taking? Authorizing Provider   amLODIPine (NORVASC) 5 mg tablet Take 1 tablet (5 mg total) by mouth daily 2/10/23   Rhona Petersen MD   atenolol (TENORMIN) 50 mg tablet Take 1 tablet (50 mg total) by mouth daily 2/10/23   Rhona Petersen MD   atorvastatin (LIPITOR) 40 mg tablet Take 1 tablet (40 mg total) by mouth daily 2/10/23   Rhona Petersen MD   escitalopram (LEXAPRO) 5 mg tablet Take 1 tablet (5 mg total) by mouth daily 2/10/23   Rhona Petersen MD   traZODone (DESYREL) 50 mg tablet Take 1 tablet (50 mg total) by mouth daily at bedtime 2/10/23   Rhona Petersen MD     I have reviewed home medications with patient personally      Allergies: No Known Allergies    Social History:     Marital Status: Single Substance Use History:   Social History     Substance and Sexual Activity   Alcohol Use Yes   • Alcohol/week: 6 0 standard drinks   • Types: 6 Cans of beer per week     Social History     Tobacco Use   Smoking Status Never   Smokeless Tobacco Current     Social History     Substance and Sexual Activity   Drug Use Never       Family History:    non-contributory    Physical Exam:     Vitals:   Blood Pressure: 112/70 (03/03/23 1425)  Pulse: 72 (03/03/23 1400)  Temperature: 98 3 °F (36 8 °C) (03/03/23 1111)  Temp Source: Temporal (03/03/23 1111)  Respirations: 18 (03/03/23 1400)  SpO2: 96 % (03/03/23 1400)    Physical Exam  Vitals and nursing note reviewed  Constitutional:       Appearance: Normal appearance  Comments: Male patient in bed, awake  Chronically ill-appearing   HENT:      Head: Normocephalic and atraumatic  Right Ear: External ear normal       Left Ear: External ear normal       Nose: Nose normal  No congestion or rhinorrhea  Mouth/Throat:      Mouth: Mucous membranes are moist       Pharynx: Oropharynx is clear  No oropharyngeal exudate or posterior oropharyngeal erythema  Eyes:      General: No scleral icterus  Right eye: No discharge  Left eye: No discharge  Pupils: Pupils are equal, round, and reactive to light  Neck:      Vascular: No carotid bruit  Cardiovascular:      Rate and Rhythm: Normal rate and regular rhythm  Pulses: Normal pulses  Heart sounds: No murmur heard  No friction rub  No gallop  Pulmonary:      Effort: Pulmonary effort is normal  No respiratory distress  Breath sounds: Normal breath sounds  No stridor  No wheezing, rhonchi or rales  Abdominal:      General: Abdomen is flat  Bowel sounds are normal  There is distension  Palpations: Abdomen is soft  There is no mass  Tenderness: There is no abdominal tenderness  There is no guarding or rebound  Hernia: No hernia is present     Musculoskeletal: General: No swelling, tenderness, deformity or signs of injury  Normal range of motion  Cervical back: Normal range of motion  No rigidity  No muscular tenderness  Lymphadenopathy:      Cervical: No cervical adenopathy  Skin:     General: Skin is warm and dry  Capillary Refill: Capillary refill takes less than 2 seconds  Coloration: Skin is not jaundiced or pale  Findings: No bruising or erythema  Neurological:      General: No focal deficit present  Mental Status: He is alert and oriented to person, place, and time  Mental status is at baseline  Cranial Nerves: No cranial nerve deficit  Sensory: No sensory deficit  Motor: No weakness  Coordination: Coordination normal       Deep Tendon Reflexes: Reflexes normal    Psychiatric:         Mood and Affect: Mood normal          Behavior: Behavior normal          Thought Content: Thought content normal          Judgment: Judgment normal          Additional Data:     Lab Results: I have personally reviewed pertinent reports  Results from last 7 days   Lab Units 03/03/23  1154   WBC Thousand/uL 8 02   HEMOGLOBIN g/dL 16 1   HEMATOCRIT % 44 1   PLATELETS Thousands/uL 136*   NEUTROS PCT % 72   LYMPHS PCT % 16   MONOS PCT % 11   EOS PCT % 0     Results from last 7 days   Lab Units 03/03/23  1154   SODIUM mmol/L 128*   POTASSIUM mmol/L 2 9*   CHLORIDE mmol/L 97   CO2 mmol/L 20*   BUN mg/dL 7   CREATININE mg/dL 0 87   ANION GAP mmol/L 11   CALCIUM mg/dL 8 8   ALBUMIN g/dL 3 1*   TOTAL BILIRUBIN mg/dL 6 43*   ALK PHOS U/L 108*   ALT U/L 49   AST U/L 82*   GLUCOSE RANDOM mg/dL 126     Results from last 7 days   Lab Units 03/03/23  1154   INR  1 80*                   Imaging: I have personally reviewed pertinent reports  CT abdomen pelvis with contrast   Final Result by April Bhatt MD (03/03 1322)      1  Cirrhosis with moderate ascites    The liver is diffusely heterogeneous with multiple hypoattenuating nodular foci   Correlation with AFP is recommended  If there is clinical concern, a follow-up nonemergent MRI can be obtained to evaluate for    discrete lesions  2   Mild small small bowel and right/transverse colonic wall thickening, likely related to portal hypertension  3   Distended gallbladder  No stones are visualized and evaluation of the gallbladder wall is limited by surrounding ascites  If there is clinical concern for acute cholecystitis, consider follow-up ultrasound  The study was marked in [facility] for immediate notification  Workstation performed: [id number]         IR INPATIENT Paracentesis    (Results Pending)           Allscripts / Epic Records Reviewed: Yes     ** Please Note: This note has been constructed using a voice recognition system   **

## 2023-03-04 ENCOUNTER — APPOINTMENT (INPATIENT)
Dept: MRI IMAGING | Facility: HOSPITAL | Age: 38
End: 2023-03-04

## 2023-03-04 LAB
AFP-TM SERPL-MCNC: 7.5 NG/ML (ref 0.5–8)
ALBUMIN SERPL BCP-MCNC: 2.4 G/DL (ref 3.5–5)
ALP SERPL-CCNC: 87 U/L (ref 34–104)
ALT SERPL W P-5'-P-CCNC: 35 U/L (ref 7–52)
ANA SER QL IA: NEGATIVE
ANION GAP SERPL CALCULATED.3IONS-SCNC: 7 MMOL/L (ref 4–13)
AST SERPL W P-5'-P-CCNC: 62 U/L (ref 13–39)
BASOPHILS # BLD AUTO: 0.12 THOUSANDS/ÂΜL (ref 0–0.1)
BASOPHILS NFR BLD AUTO: 2 % (ref 0–1)
BILIRUB SERPL-MCNC: 4.57 MG/DL (ref 0.2–1)
BUN SERPL-MCNC: 7 MG/DL (ref 5–25)
CALCIUM ALBUM COR SERPL-MCNC: 9.5 MG/DL (ref 8.3–10.1)
CALCIUM SERPL-MCNC: 8.2 MG/DL (ref 8.4–10.2)
CHLORIDE SERPL-SCNC: 105 MMOL/L (ref 96–108)
CO2 SERPL-SCNC: 18 MMOL/L (ref 21–32)
CREAT SERPL-MCNC: 0.6 MG/DL (ref 0.6–1.3)
EOSINOPHIL # BLD AUTO: 0.04 THOUSAND/ÂΜL (ref 0–0.61)
EOSINOPHIL NFR BLD AUTO: 1 % (ref 0–6)
ERYTHROCYTE [DISTWIDTH] IN BLOOD BY AUTOMATED COUNT: 17.8 % (ref 11.6–15.1)
FERRITIN SERPL-MCNC: 995 NG/ML (ref 8–388)
GFR SERPL CREATININE-BSD FRML MDRD: 127 ML/MIN/1.73SQ M
GLUCOSE SERPL-MCNC: 103 MG/DL (ref 65–140)
HAV IGM SER QL: NORMAL
HBV CORE IGM SER QL: NORMAL
HBV SURFACE AG SER QL: NORMAL
HCT VFR BLD AUTO: 37.2 % (ref 36.5–49.3)
HCV AB SER QL: NORMAL
HGB BLD-MCNC: 13.4 G/DL (ref 12–17)
IMM GRANULOCYTES # BLD AUTO: 0.01 THOUSAND/UL (ref 0–0.2)
IMM GRANULOCYTES NFR BLD AUTO: 0 % (ref 0–2)
INR PPP: 1.96 (ref 0.84–1.19)
LYMPHOCYTES # BLD AUTO: 1.83 THOUSANDS/ÂΜL (ref 0.6–4.47)
LYMPHOCYTES NFR BLD AUTO: 24 % (ref 14–44)
MAGNESIUM SERPL-MCNC: 1.9 MG/DL (ref 1.9–2.7)
MCH RBC QN AUTO: 33.9 PG (ref 26.8–34.3)
MCHC RBC AUTO-ENTMCNC: 36 G/DL (ref 31.4–37.4)
MCV RBC AUTO: 94 FL (ref 82–98)
MONOCYTES # BLD AUTO: 1.08 THOUSAND/ÂΜL (ref 0.17–1.22)
MONOCYTES NFR BLD AUTO: 14 % (ref 4–12)
NEUTROPHILS # BLD AUTO: 4.54 THOUSANDS/ÂΜL (ref 1.85–7.62)
NEUTS SEG NFR BLD AUTO: 59 % (ref 43–75)
NRBC BLD AUTO-RTO: 0 /100 WBCS
OSMOLALITY UR/SERPL-RTO: 277 MMOL/KG (ref 282–298)
OSMOLALITY UR: 484 MMOL/KG
PLATELET # BLD AUTO: 102 THOUSANDS/UL (ref 149–390)
PMV BLD AUTO: 11.7 FL (ref 8.9–12.7)
POTASSIUM SERPL-SCNC: 3.1 MMOL/L (ref 3.5–5.3)
PROT SERPL-MCNC: 6.6 G/DL (ref 6.4–8.4)
PROTHROMBIN TIME: 21.9 SECONDS (ref 11.6–14.5)
RBC # BLD AUTO: 3.95 MILLION/UL (ref 3.88–5.62)
SODIUM SERPL-SCNC: 130 MMOL/L (ref 135–147)
WBC # BLD AUTO: 7.62 THOUSAND/UL (ref 4.31–10.16)

## 2023-03-04 RX ORDER — LORAZEPAM 1 MG/1
2 TABLET ORAL ONCE AS NEEDED
Status: DISCONTINUED | OUTPATIENT
Start: 2023-03-04 | End: 2023-03-04

## 2023-03-04 RX ORDER — LORAZEPAM 1 MG/1
1 TABLET ORAL ONCE AS NEEDED
Status: COMPLETED | OUTPATIENT
Start: 2023-03-04 | End: 2023-03-04

## 2023-03-04 RX ORDER — CALCIUM CARBONATE 200(500)MG
500 TABLET,CHEWABLE ORAL DAILY PRN
Status: DISCONTINUED | OUTPATIENT
Start: 2023-03-04 | End: 2023-03-06 | Stop reason: HOSPADM

## 2023-03-04 RX ADMIN — POTASSIUM CHLORIDE 40 MEQ: 1500 TABLET, EXTENDED RELEASE ORAL at 09:56

## 2023-03-04 RX ADMIN — LORAZEPAM 1 MG: 1 TABLET ORAL at 13:38

## 2023-03-04 RX ADMIN — ESCITALOPRAM OXALATE 5 MG: 10 TABLET ORAL at 09:56

## 2023-03-04 RX ADMIN — HEPARIN SODIUM 5000 UNITS: 5000 INJECTION INTRAVENOUS; SUBCUTANEOUS at 13:38

## 2023-03-04 RX ADMIN — PREDNISOLONE SODIUM PHOSPHATE 40 MG: 15 SOLUTION ORAL at 11:26

## 2023-03-04 RX ADMIN — Medication 1 TABLET: at 09:57

## 2023-03-04 RX ADMIN — POTASSIUM CHLORIDE 40 MEQ: 1500 TABLET, EXTENDED RELEASE ORAL at 18:00

## 2023-03-04 RX ADMIN — HEPARIN SODIUM 5000 UNITS: 5000 INJECTION INTRAVENOUS; SUBCUTANEOUS at 21:33

## 2023-03-04 RX ADMIN — THIAMINE HCL TAB 100 MG 100 MG: 100 TAB at 09:57

## 2023-03-04 RX ADMIN — FOLIC ACID 1 MG: 1 TABLET ORAL at 09:57

## 2023-03-04 RX ADMIN — TRAZODONE HYDROCHLORIDE 50 MG: 50 TABLET ORAL at 21:33

## 2023-03-04 RX ADMIN — ATORVASTATIN CALCIUM 40 MG: 40 TABLET, FILM COATED ORAL at 09:56

## 2023-03-04 RX ADMIN — HEPARIN SODIUM 5000 UNITS: 5000 INJECTION INTRAVENOUS; SUBCUTANEOUS at 07:26

## 2023-03-04 RX ADMIN — GADOBUTROL 9 ML: 604.72 INJECTION INTRAVENOUS at 14:51

## 2023-03-04 NOTE — PROGRESS NOTES
[address] [facility]  Progress Note Raina Erm 1985, 45 y o  male MRN: [id number]  Unit/Bed#: -02 Encounter: [id number]  Primary Care Provider: Thuy Reilly MD   Date and time admitted to hospital: 3/3/2023 11:18 AM    Alcoholism [facility])  Assessment & Plan  · Patient does endorse alcoholism  · I will keep patient on CIWA protocol  · Continue thiamine and folate replacement  Hypokalemia  Assessment & Plan  · Replace potassium  · Recheck, check magnesium    Hyponatremia  Assessment & Plan  · Sodium noted to be 128 on admission  · The hyponatremia somewhat chronic in nature although sodium is slightly below baseline  · Urine studies with urine sodium of less than 10, urine osmolality of 484-all suggestive of reduced effective arterial blood volume in setting of cirrhosis  · Monitor BMP  Primary hypertension  Assessment & Plan  On atenolol and amlodipine  Will discontinue given soft blood pressure  · Monitor blood pressure    Depression  Assessment & Plan  · Continue Lexapro  Mixed hyperlipidemia  Assessment & Plan  · Continue atorvastatin    * Decompensated hepatic cirrhosis ([facility] [address] )  Assessment & Plan  The patient presented with abnormal blood work compatible with cirrhosis including thrombocytopenia, elevated bilirubin, elevated INR  In the setting of alcoholic cirrhosis  Also appears to have ascites on imaging  MELD 20  MDF:     Status post 2300 cc of ascites fluid removed  Evaluation negative for SBP  SAA 5 > Ascites likely due to portal hypertension    He was evaluated by GI and started on prednisone for alcoholic hepatitis    · GI following  Appreciate recommendation  · Continue prednisone 40 mg daily  · Monitor CMP, abdominal exam  · Patient will eventually need screening EGD to rule out varices  VTE Pharmacologic Prophylaxis:     Moderate Risk (Score 3-4) - Pharmacological DVT Prophylaxis Ordered: Enoxaparin (Lovenox)      Mechanical VTE Prophylaxis in Place: Yes    Patient Centered Rounds: I have performed bedside rounds with nursing staff today  Discussions with Specialists or Other Care Team Provider: GI    Education and Discussions with Family / Patient: Updated  (significant other) at bedside  Current Length of Stay: 1 day(s)    Current Patient Status: Inpatient     Discharge Plan / Estimated Discharge Date: Anticipate discharge in 48-72 hrs to home  Code Status: Level 1 - Full Code      Subjective:   Mr Nataliia De Leon is seen and examined at bedside this morning  He denies any complaints  There was no overnight events noted  He does verbalize understanding of the current evaluation regarding the new diagnosis of liver cirrhosis with attendant complications  He had about 2 L drained during paracentesis yesterday by IR  Reports feeling much better and able to eat more  He does understand that CAT scan had some concerning features noted in the liver  With the plan to obtain an MRI for better characterization of the features  At this time, patient does verbalize understanding regarding the need to permanently cease alcohol intake given new diagnosis  Pressure has remained soft  Will discontinue amlodipine and atenolol  He will benefit from Lasix and spironolactone possibly initiated in the outpatient setting given soft blood pressure at this time  He has required 3 mg of IV lorazepam since yesterday  States to have had alcohol withdrawal seizure in the past   Continue to monitor given his high risk for alcohol withdrawal     Objective:     Vitals:   Temp (24hrs), Av 3 °F (36 8 °C), Min:98 1 °F (36 7 °C), Max:98 5 °F (36 9 °C)    Temp:  [98 1 °F (36 7 °C)-98 5 °F (36 9 °C)] 98 3 °F (36 8 °C)  HR:  [69-88] 85  Resp:  [16-22] 22  BP: ()/(50-78) 93/53  SpO2:  [88 %-96 %] 90 %  Body mass index is 29 03 kg/m²  Input and Output Summary (last 24 hours):        Intake/Output Summary (Last 24 hours) at 3/4/2023 1027  Last data filed at 3/4/2023 0856  Gross per 24 hour   Intake 420 ml   Output 2360 ml   Net -1940 ml       Physical Exam:     Physical Exam  Vitals and nursing note reviewed  Constitutional:       General: He is not in acute distress  Appearance: He is well-developed  He is not toxic-appearing  HENT:      Head: Normocephalic and atraumatic  Eyes:      Conjunctiva/sclera: Conjunctivae normal    Cardiovascular:      Rate and Rhythm: Normal rate and regular rhythm  Pulses: Normal pulses  Heart sounds: No murmur heard  Pulmonary:      Effort: Pulmonary effort is normal  No respiratory distress  Breath sounds: Normal breath sounds  No wheezing  Abdominal:      General: There is no distension  Palpations: Abdomen is soft  Tenderness: There is no abdominal tenderness  Musculoskeletal:         General: No swelling  Cervical back: Neck supple  Right lower leg: No edema  Left lower leg: No edema  Skin:     General: Skin is warm and dry  Capillary Refill: Capillary refill takes less than 2 seconds  Coloration: Skin is not jaundiced or pale  Neurological:      Mental Status: He is alert and oriented to person, place, and time  Mental status is at baseline     Psychiatric:         Mood and Affect: Mood normal           Additional Data:     Labs:  Results from last 7 days   Lab Units 03/04/23  0614   WBC Thousand/uL 7 62   HEMOGLOBIN g/dL 13 4   HEMATOCRIT % 37 2   PLATELETS Thousands/uL 102*   NEUTROS PCT % 59   LYMPHS PCT % 24   MONOS PCT % 14*   EOS PCT % 1     Results from last 7 days   Lab Units 03/04/23  0614   SODIUM mmol/L 130*   POTASSIUM mmol/L 3 1*   CHLORIDE mmol/L 105   CO2 mmol/L 18*   BUN mg/dL 7   CREATININE mg/dL 0 60   ANION GAP mmol/L 7   CALCIUM mg/dL 8 2*   ALBUMIN g/dL 2 4*   TOTAL BILIRUBIN mg/dL 4 57*   ALK PHOS U/L 87   ALT U/L 35   AST U/L 62*   GLUCOSE RANDOM mg/dL 103     Results from last 7 days   Lab Units 03/03/23  1154   INR  1 80* Results from last 7 days   Lab Units 03/03/23  1154   HEMOGLOBIN A1C % 4 7           Imaging: Reviewed radiology reports from this admission including: abdominal/pelvic CT scan    Recent Cultures (last 7 days):           Lines/Drains:  Invasive Devices     Peripheral Intravenous Line  Duration           Peripheral IV 03/03/23 Distal;Right;Upper;Ventral (anterior) Arm <1 day                Telemetry:        Last 24 Hours Medication List:   Current Facility-Administered Medications   Medication Dose Route Frequency Provider Last Rate   • acetaminophen  650 mg Oral Q6H PRN Kely Rogers MD     • atorvastatin  40 mg Oral Daily Kely Rogers MD     • escitalopram  5 mg Oral Daily Kely Rogers MD     • folic acid  1 mg Oral Daily Kely Rogers MD     • heparin (porcine)  5,000 Units Subcutaneous [facility] Kely Rogers MD     • multivitamin-minerals  1 tablet Oral Daily Kely Rogers MD     • nicotine  1 patch Transdermal Daily Kely Rogers MD     • ondansetron  4 mg Intravenous Q6H PRN Kely Rogers MD     • potassium chloride  40 mEq Oral BID Kely Rogers MD     • prednisoLONE  40 mg Oral Daily Crystal Dhillon MD     • thiamine  100 mg Oral Daily Kely Rogers MD     • traZODone  50 mg Oral HS Kely Rogers MD          Today, Patient Was Seen By: Kennedy Corbett MD    ** Please Note: This note has been constructed using a voice recognition system   **

## 2023-03-04 NOTE — PROGRESS NOTES
Progress Note - Celina Tam 45 y o  male MRN: [id number]    Unit/Bed#: -02 Encounter: [id number]        Subjective:     Patient reports feeling anxious and has slight tremors from alcohol withdrawal  Otherwise, no significant events overnight  Wife at the bedside this morning  ROS: As noted in the HPI, otherwise all others negative  Objective:     Vitals: Blood pressure 93/53, pulse 85, temperature 98 3 °F (36 8 °C), resp  rate 22, height 6' 1" (1 854 m), weight 99 8 kg (220 lb), SpO2 90 %  ,Body mass index is 29 03 kg/m²  Intake/Output Summary (Last 24 hours) at 3/4/2023 1001  Last data filed at 3/4/2023 0856  Gross per 24 hour   Intake 420 ml   Output 2360 ml   Net -1940 ml       Physical Exam:     General Appearance: Alert and oriented x 3  In no respiratory distress  Lungs: Clear to auscultation bilaterally, no rales or rhonchi  Heart: Regular rate and rhythm, S1, S2 normal, no murmur, click, rub or gallop  Abdomen: Soft, non-tender, moderately-distended; bowel sounds normal; no masses or no organomegaly  Extremities: No cyanosis, edema    Invasive Devices     Peripheral Intravenous Line  Duration           Peripheral IV 03/03/23 Distal;Right;Upper;Ventral (anterior) Arm <1 day                Lab Results:  Results from last 7 days   Lab Units 03/04/23  0614   WBC Thousand/uL 7 62   HEMOGLOBIN g/dL 13 4   HEMATOCRIT % 37 2   PLATELETS Thousands/uL 102*   NEUTROS PCT % 59   LYMPHS PCT % 24   MONOS PCT % 14*   EOS PCT % 1     Results from last 7 days   Lab Units 03/04/23  0614   POTASSIUM mmol/L 3 1*   CHLORIDE mmol/L 105   CO2 mmol/L 18*   BUN mg/dL 7   CREATININE mg/dL 0 60   CALCIUM mg/dL 8 2*   ALK PHOS U/L 87   ALT U/L 35   AST U/L 62*     Results from last 7 days   Lab Units 03/03/23  1154   INR  1 80*     Results from last 7 days   Lab Units 03/03/23  1154   LIPASE u/L 188*       Imaging Studies: I have personally reviewed pertinent imaging studies      IR INPATIENT Paracentesis    Result Date: 3/3/2023  Impression: Impression: Successful diagnostic and therapeutic paracentesis Workstation performed: [id number]     CT abdomen pelvis with contrast    Result Date: 3/3/2023  Impression: 1  Cirrhosis with moderate ascites  The liver is diffusely heterogeneous with multiple hypoattenuating nodular foci  Correlation with AFP is recommended  If there is clinical concern, a follow-up nonemergent MRI can be obtained to evaluate for discrete lesions  2   Mild small small bowel and right/transverse colonic wall thickening, likely related to portal hypertension  3   Distended gallbladder  No stones are visualized and evaluation of the gallbladder wall is limited by surrounding ascites  If there is clinical concern for acute cholecystitis, consider follow-up ultrasound  The study was marked in [facility] for immediate notification  Workstation performed: [id number]         Assessment and Plan:     1) 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 complicated by ascites, thrombocytopenia, hyponatremia and alcoholic hepatitis- Cannot update today's MELD  Was 26 on admission  DF 38 on admission indicating poor prognosis  He is status post paracentesis yesterday of 2L  Negative for SBP  Consistent with portal HTN from liver disease, low protein  Patient reports that this is the first time he has been told that he has cirrhosis   Discussed diagnosis again with patient and his wife at the bedside   - CMP and INR daily  - Full liver work-up ordered  - Prednisolone 40 mg daily x 28 days, with eventual taper   - Low-sodium diet under 2 g daily limit  - CIWAA protocol, multivitamin, thiamine and folate  - Patient will need an eventual EGD to rule out esophageal varices and other changes relating to portal hypertension  - Complete abstinence from alcohol discussed with the patient and family at the bedside to prevent further decompensation of liver disease  - If hyponateremia continues to improve, will consider starting low dose Lasix 20 mg and Aldactone 50 mg daily  - If patient has changes in mental status, worsening coags or new VISHAL, would warrant liver transplant evaluation   - We will need close outpatient follow-up, discussed this with family especially in a newly diagnosed cirrhotic   - Discussed with Dr Bambi Valdez on the primary team     2) Hypoattenutating foci of liver - Noted on CT   - MRI ordered, check AFP

## 2023-03-04 NOTE — PLAN OF CARE
Problem: Potential for Falls  Goal: Patient will remain free of falls  Description: INTERVENTIONS:  - Educate patient/family on patient safety including physical limitations  - Instruct patient to call for assistance with activity   - Consult OT/PT to assist with strengthening/mobility   - Keep Call bell within reach  - Keep bed low and locked with side rails adjusted as appropriate  - Keep care items and personal belongings within reach  - Initiate and maintain comfort rounds  - Make Fall Risk Sign visible to staff  - Offer Toileting every  Hours, in advance of need  - Initiate/Maintain alarm  - Obtain necessary fall risk management equipment:   - Apply yellow socks and bracelet for high fall risk patients  - Consider moving patient to room near nurses station  Outcome: Progressing     Problem: PAIN - ADULT  Goal: Verbalizes/displays adequate comfort level or baseline comfort level  Description: Interventions:  - Encourage patient to monitor pain and request assistance  - Assess pain using appropriate pain scale  - Administer analgesics based on type and severity of pain and evaluate response  - Implement non-pharmacological measures as appropriate and evaluate response  - Consider cultural and social influences on pain and pain management  - Notify physician/advanced practitioner if interventions unsuccessful or patient reports new pain  Outcome: Progressing     Problem: INFECTION - ADULT  Goal: Absence or prevention of progression during hospitalization  Description: INTERVENTIONS:  - Assess and monitor for signs and symptoms of infection  - Monitor lab/diagnostic results  - Monitor all insertion sites, i e  indwelling lines, tubes, and drains  - Monitor endotracheal if appropriate and nasal secretions for changes in amount and color  - Rewey appropriate cooling/warming therapies per order  - Administer medications as ordered  - Instruct and encourage patient and family to use good hand hygiene technique  - Identify and instruct in appropriate isolation precautions for identified infection/condition  Outcome: Progressing  Goal: Absence of fever/infection during neutropenic period  Description: INTERVENTIONS:  - Monitor WBC    Outcome: Progressing     Problem: SAFETY ADULT  Goal: Patient will remain free of falls  Description: INTERVENTIONS:  - Educate patient/family on patient safety including physical limitations  - Instruct patient to call for assistance with activity   - Consult OT/PT to assist with strengthening/mobility   - Keep Call bell within reach  - Keep bed low and locked with side rails adjusted as appropriate  - Keep care items and personal belongings within reach  - Initiate and maintain comfort rounds  - Make Fall Risk Sign visible to staff  - Offer Toileting every  Hours, in advance of need  - Initiate/Maintain alarm  - Obtain necessary fall risk management equipment:   - Apply yellow socks and bracelet for high fall risk patients  - Consider moving patient to room near nurses station  Outcome: Progressing  Goal: Maintain or return to baseline ADL function  Description: INTERVENTIONS:  -  Assess patient's ability to carry out ADLs; assess patient's baseline for ADL function and identify physical deficits which impact ability to perform ADLs (bathing, care of mouth/teeth, toileting, grooming, dressing, etc )  - Assess/evaluate cause of self-care deficits   - Assess range of motion  - Assess patient's mobility; develop plan if impaired  - Assess patient's need for assistive devices and provide as appropriate  - Encourage maximum independence but intervene and supervise when necessary  - Involve family in performance of ADLs  - Assess for home care needs following discharge   - Consider OT consult to assist with ADL evaluation and planning for discharge  - Provide patient education as appropriate  Outcome: Progressing  Goal: Maintains/Returns to pre admission functional level  Description: INTERVENTIONS:  - Perform BMAT or MOVE assessment daily    - Set and communicate daily mobility goal to care team and patient/family/caregiver  - Collaborate with rehabilitation services on mobility goals if consulted  - Perform Range of Motion  times a day  - Reposition patient every hours  - Dangle patient times a day  - Stand patient times a day  - Ambulate patient  times a day  - Out of bed to chair  times a day   - Out of bed for meals times a day  - Out of bed for toileting  - Record patient progress and toleration of activity level   Outcome: Progressing     Problem: DISCHARGE PLANNING  Goal: Discharge to home or other facility with appropriate resources  Description: INTERVENTIONS:  - Identify barriers to discharge w/patient and caregiver  - Arrange for needed discharge resources and transportation as appropriate  - Identify discharge learning needs (meds, wound care, etc )  - Arrange for interpretive services to assist at discharge as needed  - Refer to Case Management Department for coordinating discharge planning if the patient needs post-hospital services based on physician/advanced practitioner order or complex needs related to functional status, cognitive ability, or social support system  Outcome: Progressing     Problem: Knowledge Deficit  Goal: Patient/family/caregiver demonstrates understanding of disease process, treatment plan, medications, and discharge instructions  Description: Complete learning assessment and assess knowledge base    Interventions:  - Provide teaching at level of understanding  - Provide teaching via preferred learning methods  Outcome: Progressing     Problem: METABOLIC, FLUID AND ELECTROLYTES - ADULT  Goal: Electrolytes maintained within normal limits  Description: INTERVENTIONS:  - Monitor labs and assess patient for signs and symptoms of electrolyte imbalances  - Administer electrolyte replacement as ordered  - Monitor response to electrolyte replacements, including repeat lab results as appropriate  - Instruct patient on fluid and nutrition as appropriate  Outcome: Progressing  Goal: Fluid balance maintained  Description: INTERVENTIONS:  - Monitor labs   - Monitor I/O and WT  - Instruct patient on fluid and nutrition as appropriate  - Assess for signs & symptoms of volume excess or deficit  Outcome: Progressing  Goal: Glucose maintained within target range  Description: INTERVENTIONS:  - Monitor Blood Glucose as ordered  - Assess for signs and symptoms of hyperglycemia and hypoglycemia  - Administer ordered medications to maintain glucose within target range  - Assess nutritional intake and initiate nutrition service referral as needed  Outcome: Progressing     Problem: Nutrition/Hydration-ADULT  Goal: Nutrient/Hydration intake appropriate for improving, restoring or maintaining nutritional needs  Description: Monitor and assess patient's nutrition/hydration status for malnutrition  Collaborate with interdisciplinary team and initiate plan and interventions as ordered  Monitor patient's weight and dietary intake as ordered or per policy  Utilize nutrition screening tool and intervene as necessary  Determine patient's food preferences and provide high-protein, high-caloric foods as appropriate       INTERVENTIONS:  - Monitor oral intake, urinary output, labs, and treatment plans  - Assess nutrition and hydration status and recommend course of action  - Evaluate amount of meals eaten  - Assist patient with eating if necessary   - Allow adequate time for meals  - Recommend/ encourage appropriate diets, oral nutritional supplements, and vitamin/mineral supplements  - Order, calculate, and assess calorie counts as needed  - Recommend, monitor, and adjust tube feedings and TPN/PPN based on assessed needs  - Assess need for intravenous fluids  - Provide specific nutrition/hydration education as appropriate  - Include patient/family/caregiver in decisions related to nutrition  Outcome: Progressing

## 2023-03-04 NOTE — UTILIZATION REVIEW
Initial Clinical Review    Admission: Date/Time/Statement:   Admission Orders (From admission, onward)     Ordered        03/03/23 1335  INPATIENT ADMISSION  Once                      Orders Placed This Encounter   Procedures   • INPATIENT ADMISSION     Standing Status:   Standing     Number of Occurrences:   1     Order Specific Question:   Level of Care     Answer:   Med Surg [16]     Order Specific Question:   Estimated length of stay     Answer:   More than 2 Midnights     Order Specific Question:   Certification     Answer:   I certify that inpatient services are medically necessary for this patient for a duration of greater than two midnights  See H&P and MD Progress Notes for additional information about the patient's course of treatment  ED Arrival Information     Expected   -    Arrival   3/3/2023 11:03    Acuity   Urgent            Means of arrival   Walk-In    Escorted by   Family Member    Service   Hospitalist    Admission type   Emergency            Arrival complaint   ABDOMINAL PAIN           Chief Complaint   Patient presents with   • Abnormal Lab     Pt states his pcp called him to come here for CT of his gall bladder, based on his blood work results from last week  Initial Presentation: 45 y o  male  To ER from home:   who presents with worsening abdominal distention, fatigue  and also abnormal blood work in the outpatient setting including hyponatremia  (chronic however slightly below baseline)   hypokalemia, increased bilirubin at 6 4, alk phos is also elevated but only mildly at 100, he is thrombocytopenic with platelets of 654  recently diagnosed alcoholic liver cirrhosis  IN ER,  Ascites on imaging;  MELD 20    Jaundice noted  CIWA Score of 6  (tremor, anxiety)     Admit  IP Status,  MS Level of care for  Workup/management of  Decompensated hepatic cirrhosis in setting of  SIRS w/ascites and electrolyte derangements: Will perform IR Paracentesis for evaluation of the fluid  Consult GI  Trend abd exams  Daily CMP and INR  Perform serial assessments for signs alcohol withdrawal  (CIWA Protocol) w/ativan prn  Obtain urine studies/ck osmolality  Cont home BB And amlodipine    3/03   GI CONSULT:   alcoholic hepatitis with a discriminant function score greater than 32 and underlying alcohol cirrhosis with a MELD score of 26   Complete alcohol cessation - MVI and folate supplementation  Lo Na diet (<2 gm daily)     Start prednisolone course for treatment of alcoholic hepatitis  Paracentesis to rule out SBP and calculate serum albumin ascites gradient  order a full liver ork-up;  [facility] 75  screening with MRI of the liver and alpha-fetoprotein; The Patient will need an endoscopy at some point for esophageal varices screening   w  3/03  underwent a paracentesis which did not reveal any SBP;  2300 cc of clear yellow colored fluid was removed     3/03 @  1730 and 1830 CIWA score of  13  (tremor, anxiety, agitation)             Date:   3/04       Day 2:    Patient reports feeling anxious and has slight tremors from alcohol withdrawal    Dc amlodipine and atenolol  may need to be started on Lasix and spironolactone combination if blood pressure allows  CIWA scores 3-5 thru noc  3/04  GI CONSULT:    If hyponateremia continues to improve, will consider starting low dose Lasix 20 mg and Aldactone 50 mg daily  If patient has changes in mental status, worsening coags or new VISHAL, would warrant liver transplant evaluation            ED Triage Vitals   Temperature Pulse Respirations Blood Pressure SpO2   03/03/23 1111 03/03/23 1111 03/03/23 1111 03/03/23 1111 03/03/23 1111   98 3 °F (36 8 °C) 88 17 114/65 95 %      Temp Source Heart Rate Source Patient Position - Orthostatic VS BP Location FiO2 (%)   03/03/23 1111 03/03/23 1111 03/03/23 1111 03/03/23 1111 --   Temporal Monitor Sitting Left arm       Pain Score       03/03/23 1256       2          Wt Readings from Last 1 Encounters: 03/03/23 99 8 kg (220 lb)     Additional Vital Signs:   03/04/23 11:03:54 98 3 °F (36 8 °C) 91 22 119/70 86 91 %   03/04/23 09:56:30 -- 85 -- 93/53 66 90 %   03/04/23 0916 -- -- -- -- -- --   03/04/23 07:07:07 98 3 °F (36 8 °C) 79 22 92/50 64 Abnormal  95 %   03/04/23 03:20:48 98 5 °F (36 9 °C) 79 -- 92/50 64 Abnormal  92 %   03/03/23 23:54:38 98 1 °F (36 7 °C) 83 16 92/51 65 92 %   03/03/23 19:27:14 98 1 °F (36 7 °C) 75 -- 99/58 72 92 %   03/03/23 1828 -- 82 -- 115/78 -- --   03/03/23 1729 98 4 °F (36 9 °C) 82 18 115/78 -- 96 %   03/03/23 17:19:52 98 4 °F (36 9 °C) 82 -- 115/78 90 95 %   03/03/23 17:04:57 98 4 °F (36 9 °C) 73 -- 100/62 75 95 %   03/03/23 1600 -- 69 18 105/62 78 96 %   03/03/23 1516 -- 76 -- -- -- 96 %   03/03/23 1435 -- 75 -- -- -- 94 %   03/03/23 1434 -- 78 -- -- -- 94 %   03/03/23 1433 -- 78 -- -- -- 92 %   03/03/23 1432 -- 77 -- -- -- 92 %   03/03/23 1431 -- 78 -- -- -- 88 %    03/03/23 1430 -- 76 -- 114/72 -- 95 %   03/03/23 1429 -- 72 -- -- -- 96 %   03/03/23 1428 -- 72 -- -- -- 96 %   03/03/23 1427 -- 71 -- -- -- 96 %   03/03/23 1426 -- 72 -- -- -- 96 %   03/03/23 14:25:14 -- -- -- 112/70 -- --   03/03/23 1425 -- 72 -- -- -- 96 %   03/03/23 1424 -- 73 -- 112/70 -- --   03/03/23 1400 -- 72 18 106/65 80 96 %   03/03/23 1256 -- 74 17 113/58 -- 96 %     Pertinent Labs/Diagnostic Test Results:         IR INPATIENT Paracentesis   Final Result by Velora Libman, DO (03/03 3423)   Impression: Successful diagnostic and therapeutic paracentesis         CT abdomen pelvis with contrast   Final Result by Kaleb Mora MD (03/03 1322)   1  Cirrhosis with moderate ascites  The liver is diffusely heterogeneous with multiple hypoattenuating nodular foci  Correlation with AFP is recommended  If there is clinical concern, a follow-up nonemergent MRI can be obtained to evaluate for    discrete lesions     2   Mild small small bowel and right/transverse colonic wall thickening, likely related to portal hypertension  3   Distended gallbladder  No stones are visualized and evaluation of the gallbladder wall is limited by surrounding ascites  If there is clinical concern for acute cholecystitis, consider follow-up ultrasound           MRI abdomen w wo contrast and mrcp    (Results Pending)         Results from last 7 days   Lab Units 03/04/23  0614 03/03/23  1154   WBC Thousand/uL 7 62 8 02   HEMOGLOBIN g/dL 13 4 16 1   HEMATOCRIT % 37 2 44 1   PLATELETS Thousands/uL 102* 136*   NEUTROS ABS Thousands/µL 4 54 5 69         Results from last 7 days   Lab Units 03/04/23  0614 03/03/23  1154   SODIUM mmol/L 130* 128*   POTASSIUM mmol/L 3 1* 2 9*   CHLORIDE mmol/L 105 97   CO2 mmol/L 18* 20*   ANION GAP mmol/L 7 11   BUN mg/dL 7 7   CREATININE mg/dL 0 60 0 87   EGFR ml/min/1 73sq m 127 109   CALCIUM mg/dL 8 2* 8 8   MAGNESIUM mg/dL 1 9 1 9     Results from last 7 days   Lab Units 03/04/23  0614 03/03/23  1154   AST U/L 62* 82*   ALT U/L 35 49   ALK PHOS U/L 87 108*   TOTAL PROTEIN g/dL 6 6 8 5*   ALBUMIN g/dL 2 4* 3 1*   TOTAL BILIRUBIN mg/dL 4 57* 6 43*         Results from last 7 days   Lab Units 03/04/23  0614 03/03/23  1154   GLUCOSE RANDOM mg/dL 103 126     Results from last 7 days   Lab Units 03/04/23  0614   OSMOLALITY, SERUM mmol/*     Results from last 7 days   Lab Units 03/03/23  1154   HEMOGLOBIN A1C % 4 7   EAG mg/dl 88     Results from last 7 days   Lab Units 03/04/23  1019 03/03/23  1154   PROTIME seconds 21 9* 20 5*   INR  1 96* 1 80*   PTT seconds  --  38*     Results from last 7 days   Lab Units 03/03/23  1154   FERRITIN ng/mL 995*     Results from last 7 days   Lab Units 03/03/23 2010   HEP B S AG  Non-reactive   HEP C AB  Non-reactive   HEP B C IGM  Non-reactive     Results from last 7 days   Lab Units 03/03/23  1154   LIPASE u/L 188*             Results from last 7 days   Lab Units 03/04/23  0614 03/03/23 2005   OSMOLALITY, SERUM mmol/*  --    OSMO UR mmol/KG  --  484     Results from last 7 days   Lab Units 03/03/23 2005   SODIUM UR  <10     Results from last 7 days   Lab Units 03/03/23  1500   GRAM STAIN RESULT  Rare Polys  No bacteria seen   BODY FLUID CULTURE, STERILE  No growth     Results from last 7 days   Lab Units 03/03/23  1500   TOTAL COUNTED  100   WBC FLUID /ul 64           ED Treatment:   Medication Administration from 03/03/2023 1103 to 03/03/2023 1701    Date/Time Order Dose Route Action   03/03/2023 1135 EST LORazepam (ATIVAN) tablet 1 mg 1 mg Oral Given   03/03/2023 1257 EST potassium chloride (K-DUR,KLOR-CON) CR tablet 40 mEq 40 mEq Oral Given   03/03/2023 1601 EST potassium chloride 20 mEq IVPB (premix) 20 mEq Intravenous New Bag   03/03/2023 1303 EST potassium chloride 20 mEq IVPB (premix) 20 mEq Intravenous New Bag   03/03/2023 1433 EST lidocaine (PF) (XYLOCAINE-MPF) 1 % injection 5 mL Infiltration Given          Past Medical History:   Diagnosis Date   • Hyperlipidemia    • Hypertension    • Stroke [facility])      Present on Admission:  • Decompensated hepatic cirrhosis ([facility] 75 )  • Mixed hyperlipidemia  • Depression  • Primary hypertension  • Hypokalemia  • Alcoholism ([place] [address] )      Admitting Diagnosis: Hypokalemia [E87 6]  Alcoholism ([place] [address] ) [F10 20]  Cirrhosis ([place] [address] ) [K74 60]  Hyponatremia [E87 1]  Abnormal laboratory test result [R89 9]  Ascites [R18 8]  Age/Sex: 45 y o  male     Admission Orders:    See above note  Continuous pulse oximetry  Routine VS   Serial CIWA Scoring for 72 consecutive hrs   2 gm Na diet        Scheduled Medications:  atorvastatin, 40 mg, Oral, Daily  escitalopram, 5 mg, Oral, Daily  folic acid, 1 mg, Oral, Daily  heparin (porcine), 5,000 Units, Subcutaneous, Q8H [facility]  multivitamin-minerals, 1 tablet, Oral, Daily  nicotine, 1 patch, Transdermal, Daily  potassium chloride, 40 mEq, Oral, BID  prednisoLONE, 40 mg, Oral, Daily  thiamine, 100 mg, Oral, Daily  traZODone, 50 mg, Oral, HS        3/03  @   1836  GIVEN   PO  Ativan  2 mg x1         PRN Meds: acetaminophen, 650 mg, Oral, Q6H PRN  LORazepam, 1 mg, Oral, Once PRN  ondansetron, 4 mg, Intravenous, Q6H PRN        IP CONSULT TO GASTROENTEROLOGY  IP CONSULT TO ED CRISIS WORKER    Network Utilization Review Department  ATTENTION: Please call with any questions or concerns to [phone] and carefully listen to the prompts so that you are directed to the right person  All voicemails are confidential   Arniemelany Sandersonrangel all requests for admission clinical reviews, approved or denied determinations and any other requests to dedicated fax number below belonging to the campus where the patient is receiving treatment   List of dedicated fax numbers for the Facilities:  [address] DENIALS (Administrative/Medical Necessity) [phone]   [address] (Maternity/NICU/Pediatrics) [phone]   910 Clari Grubbs [phone]   [place] Gonzales 77 [phone]   [address] [place] [address] [address] Lopez 28 [phone]   [facility] 134 815 [facility] [phone]

## 2023-03-05 LAB
A1AT SERPL-MCNC: 158 MG/DL (ref 95–164)
ALBUMIN SERPL BCP-MCNC: 2.8 G/DL (ref 3.5–5)
ALP SERPL-CCNC: 100 U/L (ref 34–104)
ALT SERPL W P-5'-P-CCNC: 40 U/L (ref 7–52)
ANION GAP SERPL CALCULATED.3IONS-SCNC: 7 MMOL/L (ref 4–13)
AST SERPL W P-5'-P-CCNC: 65 U/L (ref 13–39)
BILIRUB SERPL-MCNC: 4.9 MG/DL (ref 0.2–1)
BUN SERPL-MCNC: 7 MG/DL (ref 5–25)
CALCIUM ALBUM COR SERPL-MCNC: 9.4 MG/DL (ref 8.3–10.1)
CALCIUM SERPL-MCNC: 8.4 MG/DL (ref 8.4–10.2)
CERULOPLASMIN SERPL-MCNC: 11.2 MG/DL (ref 16–31)
CHLORIDE SERPL-SCNC: 102 MMOL/L (ref 96–108)
CO2 SERPL-SCNC: 20 MMOL/L (ref 21–32)
CREAT SERPL-MCNC: 0.68 MG/DL (ref 0.6–1.3)
GFR SERPL CREATININE-BSD FRML MDRD: 121 ML/MIN/1.73SQ M
GLUCOSE SERPL-MCNC: 115 MG/DL (ref 65–140)
INR PPP: 1.63 (ref 0.84–1.19)
POTASSIUM SERPL-SCNC: 3.5 MMOL/L (ref 3.5–5.3)
PROT SERPL-MCNC: 7.6 G/DL (ref 6.4–8.4)
PROTHROMBIN TIME: 19 SECONDS (ref 11.6–14.5)
SODIUM SERPL-SCNC: 129 MMOL/L (ref 135–147)

## 2023-03-05 RX ORDER — LORAZEPAM 1 MG/1
2 TABLET ORAL ONCE
Status: COMPLETED | OUTPATIENT
Start: 2023-03-05 | End: 2023-03-05

## 2023-03-05 RX ADMIN — THIAMINE HCL TAB 100 MG 100 MG: 100 TAB at 08:02

## 2023-03-05 RX ADMIN — HEPARIN SODIUM 5000 UNITS: 5000 INJECTION INTRAVENOUS; SUBCUTANEOUS at 16:11

## 2023-03-05 RX ADMIN — HEPARIN SODIUM 5000 UNITS: 5000 INJECTION INTRAVENOUS; SUBCUTANEOUS at 05:00

## 2023-03-05 RX ADMIN — CALCIUM CARBONATE (ANTACID) CHEW TAB 500 MG 500 MG: 500 CHEW TAB at 04:57

## 2023-03-05 RX ADMIN — ONDANSETRON 4 MG: 2 INJECTION INTRAMUSCULAR; INTRAVENOUS at 00:02

## 2023-03-05 RX ADMIN — HEPARIN SODIUM 5000 UNITS: 5000 INJECTION INTRAVENOUS; SUBCUTANEOUS at 21:48

## 2023-03-05 RX ADMIN — ATORVASTATIN CALCIUM 40 MG: 40 TABLET, FILM COATED ORAL at 08:03

## 2023-03-05 RX ADMIN — LORAZEPAM 2 MG: 1 TABLET ORAL at 08:03

## 2023-03-05 RX ADMIN — PREDNISOLONE SODIUM PHOSPHATE 40 MG: 15 SOLUTION ORAL at 08:05

## 2023-03-05 RX ADMIN — FOLIC ACID 1 MG: 1 TABLET ORAL at 08:02

## 2023-03-05 RX ADMIN — POTASSIUM CHLORIDE 40 MEQ: 1500 TABLET, EXTENDED RELEASE ORAL at 17:17

## 2023-03-05 RX ADMIN — Medication 1 TABLET: at 08:02

## 2023-03-05 RX ADMIN — POTASSIUM CHLORIDE 40 MEQ: 1500 TABLET, EXTENDED RELEASE ORAL at 08:02

## 2023-03-05 RX ADMIN — TRAZODONE HYDROCHLORIDE 50 MG: 50 TABLET ORAL at 21:48

## 2023-03-05 RX ADMIN — ESCITALOPRAM OXALATE 5 MG: 10 TABLET ORAL at 08:02

## 2023-03-05 NOTE — PLAN OF CARE
Problem: INFECTION - ADULT  Goal: Absence or prevention of progression during hospitalization  Description: INTERVENTIONS:  - Assess and monitor for signs and symptoms of infection  - Monitor lab/diagnostic results  - Monitor all insertion sites, i e  indwelling lines, tubes, and drains  - Monitor endotracheal if appropriate and nasal secretions for changes in amount and color  - [place] appropriate cooling/warming therapies per order  - Administer medications as ordered  - Instruct and encourage patient and family to use good hand hygiene technique  - Identify and instruct in appropriate isolation precautions for identified infection/condition  Outcome: Progressing     Problem: INFECTION - ADULT  Goal: Absence of fever/infection during neutropenic period  Description: INTERVENTIONS:  - Monitor WBC    Outcome: Progressing

## 2023-03-05 NOTE — PROGRESS NOTES
Progress Note - Megha Rudd 45 y o  male MRN: [id number]    Unit/Bed#: -02 Encounter: [id number]        Subjective:     Patient reports feeling anxious  Patient is receiving Ativan for CIWA protocol  No new events overnight  He has no other complaints  ROS: As noted in the HPI, otherwise all others negative  Objective:     Vitals: Blood pressure 112/65, pulse 90, temperature 98 4 °F (36 9 °C), resp  rate 13, height 6' 1" (1 854 m), weight 99 8 kg (220 lb), SpO2 93 %  ,Body mass index is 29 03 kg/m²  No intake or output data in the 24 hours ending 03/05/23 0930    Physical Exam:     General Appearance: Alert and oriented x 3  In no respiratory distress  Lungs: Clear to auscultation bilaterally, no rales or rhonchi  Heart: Regular rate and rhythm, S1, S2 normal, no murmur, click, rub or gallop  Abdomen: Soft, non-tender, moderately distended; bowel sounds normal; no masses or no organomegaly  Extremities: No cyanosis, edema    Invasive Devices     Peripheral Intravenous Line  Duration           Peripheral IV 03/03/23 Distal;Right;Upper;Ventral (anterior) Arm 1 day                Lab Results:  Results from last 7 days   Lab Units 03/04/23  0614   WBC Thousand/uL 7 62   HEMOGLOBIN g/dL 13 4   HEMATOCRIT % 37 2   PLATELETS Thousands/uL 102*   NEUTROS PCT % 59   LYMPHS PCT % 24   MONOS PCT % 14*   EOS PCT % 1     Results from last 7 days   Lab Units 03/05/23  0440   POTASSIUM mmol/L 3 5   CHLORIDE mmol/L 102   CO2 mmol/L 20*   BUN mg/dL 7   CREATININE mg/dL 0 68   CALCIUM mg/dL 8 4   ALK PHOS U/L 100   ALT U/L 40   AST U/L 65*     Results from last 7 days   Lab Units 03/05/23  0440   INR  1 63*     Results from last 7 days   Lab Units 03/03/23  1154   LIPASE u/L 188*       Imaging Studies: I have personally reviewed pertinent imaging studies      IR INPATIENT Paracentesis    Result Date: 3/3/2023  Impression: Impression: Successful diagnostic and therapeutic paracentesis Workstation performed: [id number]     CT abdomen pelvis with contrast    Result Date: 3/3/2023  Impression: 1  Cirrhosis with moderate ascites  The liver is diffusely heterogeneous with multiple hypoattenuating nodular foci  Correlation with AFP is recommended  If there is clinical concern, a follow-up nonemergent MRI can be obtained to evaluate for discrete lesions  2   Mild small small bowel and right/transverse colonic wall thickening, likely related to portal hypertension  3   Distended gallbladder  No stones are visualized and evaluation of the gallbladder wall is limited by surrounding ascites  If there is clinical concern for acute cholecystitis, consider follow-up ultrasound  The study was marked in [facility] for immediate notification  Workstation performed: [id number]         Assessment and Plan:     1) 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 complicated by ascites, thrombocytopenia, hyponatremia and alcoholic hepatitis- MELD 18  DF 38 on admission indicating poor prognosis for alcoholic hepatitis  He is status post paracentesis on admission of 2 L  Negative for SBP  Consistent with portal HTN from liver disease, low protein   Patient reports that this is the first time he has been told that he has cirrhosis     - CMP and INR daily  - Full liver work-up ordered  - Prednisolone 40 mg daily  - [place] score calculated on day 7 of treatment for alcoholic hepatitis  - Low-sodium diet under 2 g daily limit  - CIWAA protocol, multivitamin, thiamine and folate  - We will plan for EGD tomorrow to rule out esophageal varices  - Complete abstinence from alcohol  - Once hyponatremia improves, will consider starting low dose Lasix 20 mg and Aldactone 50 mg daily  - If patient has changes in mental status, worsening coags or new VISHAL, would warrant liver transplant evaluation   - We will need close outpatient follow-up, discussed this with family especially in a newly diagnosed cirrhotic   - Discussed with Dr Rudy Florian on the primary team     2) Hypoattenutating foci of liver - Noted on CT  AFP normal   Awaiting MRI results    Again, reiterated that he will need imaging every 6 months [facility]  scre[facility] future

## 2023-03-05 NOTE — PROGRESS NOTES
[address] [facility]  Progress Note Sarah Rushr 1985, 45 y o  male MRN: [id number]  Unit/Bed#: -02 Encounter: [id number]  Primary Care Provider: Sixto He MD   Date and time admitted to hospital: 3/3/2023 11:18 AM    Alcoholism [facility])  Assessment & Plan  · Patient does endorse alcoholism  · Continue on CIWA protocol  · Continue thiamine and folate replacement  Hypokalemia  Assessment & Plan  · Replace potassium  · Recheck, check magnesium    Hyponatremia  Assessment & Plan  · Sodium noted to be 128 on admission  · The hyponatremia somewhat chronic in nature although sodium is slightly below baseline  · Urine studies with urine sodium of less than 10, urine osmolality of 484-all suggestive of reduced effective arterial blood volume in setting of cirrhosis  · Monitor BMP  Primary hypertension  Assessment & Plan  On atenolol and amlodipine  Will discontinue given soft blood pressure  · Monitor blood pressure    Depression  Assessment & Plan  · Continue Lexapro  Mixed hyperlipidemia  Assessment & Plan  · Continue atorvastatin    * Decompensated hepatic cirrhosis ([facility] )  Assessment & Plan  The patient presented with abnormal blood work compatible with cirrhosis including thrombocytopenia, elevated bilirubin, elevated INR  In the setting of alcoholic cirrhosis  Also appears to have ascites on imaging  MELD 20  MDF: 42 (3/3/2023)    Status post 2300 cc of ascites fluid removed  Evaluation negative for SBP  SAA 5 > Ascites likely due to portal hypertension    He was evaluated by GI and started on prednisone for alcoholic hepatitis    · GI following  Appreciate recommendation  · Plan for EGD on 3/6/2023  · Continue prednisone 40 mg daily  · Monitor CMP, abdominal exam            VTE Pharmacologic Prophylaxis:     Moderate Risk (Score 3-4) - Pharmacological DVT Prophylaxis Ordered: Heparin      Mechanical VTE Prophylaxis in Place: Yes    Patient Centered Rounds: I have performed bedside rounds with nursing staff today  Discussions with Specialists or Other Care Team Provider: GI    Education and Discussions with Family / Patient: Updated  (significant other) at bedside  Current Length of Stay: 2 day(s)    Current Patient Status: Inpatient     Discharge Plan / Estimated Discharge Date: 48 hours    Code Status: Level 1 - Full Code      Subjective:   Patient was seen and examined by me at bedside  Patient was pleasant and cooperative  No particular overnight event reported  Patient does report reduced appetite, also reports vague abdominal discomfort due to fluid reaccumulation  Otherwise, patient has no new complaints or concerns  Objective:     Vitals:   Temp (24hrs), Av 3 °F (36 8 °C), Min:98 3 °F (36 8 °C), Max:98 4 °F (36 9 °C)    Temp:  [98 3 °F (36 8 °C)-98 4 °F (36 9 °C)] 98 4 °F (36 9 °C)  HR:  [] 90  Resp:  [13-22] 13  BP: ()/(53-71) 112/65  SpO2:  [90 %-94 %] 93 %  Body mass index is 29 03 kg/m²  Input and Output Summary (last 24 hours):     No intake or output data in the 24 hours ending 23 0943    Physical Exam:     Physical Exam  Constitutional:       General: He is not in acute distress  Appearance: Normal appearance  He is not ill-appearing  HENT:      Head: Normocephalic and atraumatic  Eyes:      General: No scleral icterus  Right eye: No discharge  Left eye: No discharge  Cardiovascular:      Rate and Rhythm: Normal rate and regular rhythm  Pulses: Normal pulses  Pulmonary:      Effort: Pulmonary effort is normal  No respiratory distress  Abdominal:      General: There is distension  Musculoskeletal:         General: No swelling, tenderness or signs of injury  Normal range of motion  Cervical back: Normal range of motion  Skin:     General: Skin is warm and dry  Neurological:      General: No focal deficit present  Mental Status: He is alert   Mental status is at baseline     Psychiatric:         Mood and Affect: Mood normal          Behavior: Behavior normal           Additional Data:     Labs:  Results from last 7 days   Lab Units 03/04/23  0614   WBC Thousand/uL 7 62   HEMOGLOBIN g/dL 13 4   HEMATOCRIT % 37 2   PLATELETS Thousands/uL 102*   NEUTROS PCT % 59   LYMPHS PCT % 24   MONOS PCT % 14*   EOS PCT % 1     Results from last 7 days   Lab Units 03/05/23  0440   SODIUM mmol/L 129*   POTASSIUM mmol/L 3 5   CHLORIDE mmol/L 102   CO2 mmol/L 20*   BUN mg/dL 7   CREATININE mg/dL 0 68   ANION GAP mmol/L 7   CALCIUM mg/dL 8 4   ALBUMIN g/dL 2 8*   TOTAL BILIRUBIN mg/dL 4 90*   ALK PHOS U/L 100   ALT U/L 40   AST U/L 65*   GLUCOSE RANDOM mg/dL 115     Results from last 7 days   Lab Units 03/05/23  0440   INR  1 63*         Results from last 7 days   Lab Units 03/03/23  1154   HEMOGLOBIN A1C % 4 7           Imaging: Reviewed radiology reports from this admission including: abdominal/pelvic CT scan    Recent Cultures (last 7 days):     Results from last 7 days   Lab Units 03/03/23  1500   GRAM STAIN RESULT  Rare Polys  No bacteria seen   BODY FLUID CULTURE, STERILE  No growth       Lines/Drains:  Invasive Devices     Peripheral Intravenous Line  Duration           Peripheral IV 03/03/23 Distal;Right;Upper;Ventral (anterior) Arm 1 day                Telemetry:        Last 24 Hours Medication List:   Current Facility-Administered Medications   Medication Dose Route Frequency Provider Last Rate   • acetaminophen  650 mg Oral Q6H PRN Darci Chow MD     • atorvastatin  40 mg Oral Daily Darci Chow MD     • calcium carbonate  500 mg Oral Daily PRN Karlene Pineda PA-C     • escitalopram  5 mg Oral Daily Darci Chow MD     • folic acid  1 mg Oral Daily Darci Chow MD     • heparin (porcine)  5,000 Units Subcutaneous [facility] Darci Chow MD     • multivitamin-minerals  1 tablet Oral Daily Darci Chow MD     • nicotine  1 patch Transdermal Daily Dina Trent MD     • ondansetron  4 mg Intravenous Q6H PRN Dina Trent MD     • potassium chloride  40 mEq Oral BID Dina Trent MD     • prednisoLONE  40 mg Oral Daily Salo Young MD     • thiamine  100 mg Oral Daily Dina Trent MD     • traZODone  50 mg Oral HS Dina Trent MD          Today, Patient Was Seen By: Isai Garcia MD    ** Please Note: This note has been constructed using a voice recognition system   **

## 2023-03-06 ENCOUNTER — APPOINTMENT (OUTPATIENT)
Dept: NON INVASIVE DIAGNOSTICS | Facility: HOSPITAL | Age: 38
End: 2023-03-06

## 2023-03-06 ENCOUNTER — ANESTHESIA EVENT (INPATIENT)
Dept: GASTROENTEROLOGY | Facility: HOSPITAL | Age: 38
End: 2023-03-06

## 2023-03-06 ENCOUNTER — APPOINTMENT (INPATIENT)
Dept: GASTROENTEROLOGY | Facility: HOSPITAL | Age: 38
End: 2023-03-06

## 2023-03-06 ENCOUNTER — ANESTHESIA (INPATIENT)
Dept: GASTROENTEROLOGY | Facility: HOSPITAL | Age: 38
End: 2023-03-06

## 2023-03-06 VITALS
BODY MASS INDEX: 29.16 KG/M2 | TEMPERATURE: 98.7 F | WEIGHT: 220 LBS | OXYGEN SATURATION: 97 % | HEIGHT: 73 IN | DIASTOLIC BLOOD PRESSURE: 77 MMHG | HEART RATE: 76 BPM | SYSTOLIC BLOOD PRESSURE: 125 MMHG | RESPIRATION RATE: 17 BRPM

## 2023-03-06 PROBLEM — K70.11 ALCOHOLIC HEPATITIS WITH ASCITES: Status: ACTIVE | Noted: 2023-03-06

## 2023-03-06 LAB
ACTIN IGG SERPL-ACNC: 16 UNITS (ref 0–19)
ALBUMIN SERPL BCP-MCNC: 2.5 G/DL (ref 3.5–5)
ALP SERPL-CCNC: 92 U/L (ref 34–104)
ALT SERPL W P-5'-P-CCNC: 36 U/L (ref 7–52)
ANION GAP SERPL CALCULATED.3IONS-SCNC: 4 MMOL/L (ref 4–13)
AST SERPL W P-5'-P-CCNC: 56 U/L (ref 13–39)
BACTERIA SPEC BFLD CULT: NO GROWTH
BILIRUB SERPL-MCNC: 3.78 MG/DL (ref 0.2–1)
BUN SERPL-MCNC: 7 MG/DL (ref 5–25)
CALCIUM ALBUM COR SERPL-MCNC: 9.6 MG/DL (ref 8.3–10.1)
CALCIUM SERPL-MCNC: 8.4 MG/DL (ref 8.4–10.2)
CHLORIDE SERPL-SCNC: 106 MMOL/L (ref 96–108)
CO2 SERPL-SCNC: 22 MMOL/L (ref 21–32)
CREAT SERPL-MCNC: 0.61 MG/DL (ref 0.6–1.3)
GFR SERPL CREATININE-BSD FRML MDRD: 126 ML/MIN/1.73SQ M
GLUCOSE SERPL-MCNC: 98 MG/DL (ref 65–140)
GRAM STN SPEC: NORMAL
GRAM STN SPEC: NORMAL
INR PPP: 1.98 (ref 0.84–1.19)
MITOCHONDRIA M2 IGG SER-ACNC: <20 UNITS (ref 0–20)
POTASSIUM SERPL-SCNC: 3.9 MMOL/L (ref 3.5–5.3)
PROT SERPL-MCNC: 6.6 G/DL (ref 6.4–8.4)
PROTHROMBIN TIME: 22 SECONDS (ref 11.6–14.5)
SODIUM SERPL-SCNC: 132 MMOL/L (ref 135–147)

## 2023-03-06 PROCEDURE — 0W9G3ZZ DRAINAGE OF PERITONEAL CAVITY, PERCUTANEOUS APPROACH: ICD-10-PCS | Performed by: RADIOLOGY

## 2023-03-06 PROCEDURE — 0DJ08ZZ INSPECTION OF UPPER INTESTINAL TRACT, VIA NATURAL OR ARTIFICIAL OPENING ENDOSCOPIC: ICD-10-PCS | Performed by: INTERNAL MEDICINE

## 2023-03-06 RX ORDER — SODIUM CHLORIDE, SODIUM LACTATE, POTASSIUM CHLORIDE, CALCIUM CHLORIDE 600; 310; 30; 20 MG/100ML; MG/100ML; MG/100ML; MG/100ML
INJECTION, SOLUTION INTRAVENOUS CONTINUOUS PRN
Status: DISCONTINUED | OUTPATIENT
Start: 2023-03-06 | End: 2023-03-06

## 2023-03-06 RX ORDER — LANOLIN ALCOHOL/MO/W.PET/CERES
100 CREAM (GRAM) TOPICAL DAILY
Qty: 30 TABLET | Refills: 0 | Status: SHIPPED | OUTPATIENT
Start: 2023-03-07 | End: 2023-03-15 | Stop reason: SDUPTHER

## 2023-03-06 RX ORDER — PROPOFOL 10 MG/ML
INJECTION, EMULSION INTRAVENOUS AS NEEDED
Status: DISCONTINUED | OUTPATIENT
Start: 2023-03-06 | End: 2023-03-06

## 2023-03-06 RX ORDER — NADOLOL 40 MG/1
40 TABLET ORAL DAILY
Status: CANCELLED | OUTPATIENT
Start: 2023-03-06

## 2023-03-06 RX ORDER — FOLIC ACID 1 MG/1
1 TABLET ORAL DAILY
Qty: 30 TABLET | Refills: 0 | Status: SHIPPED | OUTPATIENT
Start: 2023-03-07 | End: 2023-03-15 | Stop reason: SDUPTHER

## 2023-03-06 RX ORDER — LIDOCAINE HYDROCHLORIDE 10 MG/ML
INJECTION, SOLUTION EPIDURAL; INFILTRATION; INTRACAUDAL; PERINEURAL AS NEEDED
Status: DISCONTINUED | OUTPATIENT
Start: 2023-03-06 | End: 2023-03-06

## 2023-03-06 RX ORDER — PREDNISOLONE SODIUM PHOSPHATE 15 MG/5ML
40 SOLUTION ORAL DAILY
Qty: 93.1 ML | Refills: 0 | Status: SHIPPED | OUTPATIENT
Start: 2023-03-07 | End: 2023-03-15 | Stop reason: SDUPTHER

## 2023-03-06 RX ORDER — NADOLOL 20 MG/1
20 TABLET ORAL DAILY
Qty: 15 TABLET | Refills: 0 | Status: SHIPPED | OUTPATIENT
Start: 2023-03-06 | End: 2023-03-15 | Stop reason: SDUPTHER

## 2023-03-06 RX ADMIN — SODIUM CHLORIDE, SODIUM LACTATE, POTASSIUM CHLORIDE, AND CALCIUM CHLORIDE: .6; .31; .03; .02 INJECTION, SOLUTION INTRAVENOUS at 14:00

## 2023-03-06 RX ADMIN — THIAMINE HCL TAB 100 MG 100 MG: 100 TAB at 08:31

## 2023-03-06 RX ADMIN — HEPARIN SODIUM 5000 UNITS: 5000 INJECTION INTRAVENOUS; SUBCUTANEOUS at 15:15

## 2023-03-06 RX ADMIN — PROPOFOL 50 MG: 10 INJECTION, EMULSION INTRAVENOUS at 14:19

## 2023-03-06 RX ADMIN — Medication 1 TABLET: at 08:30

## 2023-03-06 RX ADMIN — FOLIC ACID 1 MG: 1 TABLET ORAL at 08:30

## 2023-03-06 RX ADMIN — PROPOFOL 50 MG: 10 INJECTION, EMULSION INTRAVENOUS at 14:18

## 2023-03-06 RX ADMIN — PREDNISOLONE SODIUM PHOSPHATE 40 MG: 15 SOLUTION ORAL at 08:29

## 2023-03-06 RX ADMIN — ESCITALOPRAM OXALATE 5 MG: 10 TABLET ORAL at 08:30

## 2023-03-06 RX ADMIN — LIDOCAINE HYDROCHLORIDE 50 MG: 10 INJECTION, SOLUTION EPIDURAL; INFILTRATION; INTRACAUDAL at 14:16

## 2023-03-06 RX ADMIN — PROPOFOL 150 MG: 10 INJECTION, EMULSION INTRAVENOUS at 14:16

## 2023-03-06 RX ADMIN — HEPARIN SODIUM 5000 UNITS: 5000 INJECTION INTRAVENOUS; SUBCUTANEOUS at 05:48

## 2023-03-06 RX ADMIN — ATORVASTATIN CALCIUM 40 MG: 40 TABLET, FILM COATED ORAL at 08:30

## 2023-03-06 RX ADMIN — PROPOFOL 50 MG: 10 INJECTION, EMULSION INTRAVENOUS at 14:20

## 2023-03-06 NOTE — ASSESSMENT & PLAN NOTE
Initiated on prednisone 40 mg daily given high MDF score  Plan to obtain CBC, CMP and PT/INR on 3/10 -with follow-up with gastroenterology 
On atenolol and amlodipine  Will discontinue given soft blood pressure    · Monitor blood pressure
On atenolol and amlodipine  Will discontinue given soft blood pressure    · Monitor blood pressure
On atenolol and amlodipine at home  Now discontinued due to soft blood pressure  EGD with portal gastropathy and esophageal varices  Was initiated on nadolol 20 mg daily  Amlodipine and atenolol discontinued  Will be PCP regarding blood pressure medication modification    · Monitor blood pressure at home
The patient presented with abnormal blood work compatible with cirrhosis including thrombocytopenia, elevated bilirubin, elevated INR  In the setting of alcoholic cirrhosis  Also appears to have ascites on imaging  MELD 20    · I have ordered IR paracentesis for evaluation of the fluid   · GI will be consulted - input will be appreciated  · Monitor CMP, abdominal exam
The patient presented with abnormal blood work compatible with cirrhosis including thrombocytopenia, elevated bilirubin, elevated INR  In the setting of alcoholic cirrhosis  Also appears to have ascites on imaging  MELD 20  MDF: 42 (3/3/2023)    Status post 2300 cc of ascites fluid removed  Evaluation negative for SBP  SAA 5 > Ascites likely due to portal hypertension    He was evaluated by GI and started on prednisone for alcoholic hepatitis    · GI following  Appreciate recommendation    · Plan for EGD on 3/6/2023  · Continue prednisone 40 mg daily  · Monitor CMP, abdominal exam
The patient presented with abnormal blood work compatible with cirrhosis including thrombocytopenia, elevated bilirubin, elevated INR  In the setting of alcoholic cirrhosis  Also appears to have ascites on imaging  MELD 20  MDF: 42 (3/3/2023)    Status post 2300 cc of ascites fluid removed  Evaluation negative for SBP  SAA 5 > Ascites likely due to portal hypertension    He was evaluated by GI and started on prednisone for alcoholic hepatitis    · GI following  Appreciate recommendation  · Continue prednisone 40 mg daily  · Monitor CMP, abdominal exam  · Patient will eventually need screening EGD to rule out varices 
The patient presented with abnormal blood work compatible with cirrhosis including thrombocytopenia, elevated bilirubin, elevated INR  In the setting of alcoholic cirrhosis  Also appears to have ascites on imaging  MELD 20  MDF: 42 (3/3/2023) >     Status post 2300 cc of ascites fluid removed  Evaluation negative for SBP  SAA 5 > Ascites likely due to portal hypertension    He was evaluated by GI and started on prednisone for alcoholic hepatitis    · GI following  Appreciate recommendation  · Plan for EGD on 3/6/2023  · EGD with mild to moderate esophageal varices and portal gastropathy  · Continue prednisone 40 mg daily > outpatient follow-up with gastroenterology regarding discontinuation of prednisone  · Started on nadolol 20 mg on discharge    EGD on 3/6  IMPRESSION:  1   4 columns of grade 2 esophageal varices in the distal esophagus  2  Moderate to severe portal gastropathy, diffuse  3    Food retention in the stomach consistent with gastroparesis
· Continue Lexapro
· Continue atenolol and amlodipine  · Monitor blood pressure
· Continue atorvastatin
· Now replaced  Potassium this morning is within normal limit  · Magnesium within normal limit    · Obtain CMP on 3/10
· Patient does endorse alcoholism  · Continue on CIWA protocol  · Continue thiamine and folate replacement 
· Patient does endorse alcoholism  · Continue on CIWA protocol  · Continue thiamine and folate replacement  · Strongly advised to cease alcohol intake 
· Patient does endorse alcoholism  · I will keep patient on CIWA protocol
· Patient does endorse alcoholism  · I will keep patient on CIWA protocol  · Continue thiamine and folate replacement 
· Replace potassium  · Recheck, check magnesium
· Sodium noted to be 128 on admission  · The hyponatremia somewhat chronic in nature although sodium is slightly below baseline  · Likely in the setting of cirrhosis  · Check urine studies, check osmolality  · Monitor BMP
· Sodium noted to be 128 on admission  · The hyponatremia somewhat chronic in nature although sodium is slightly below baseline  · Urine studies with urine sodium of less than 10, urine osmolality of 484-all suggestive of reduced effective arterial blood volume in setting of cirrhosis    · Obtain CMP on 3/10
· Sodium noted to be 128 on admission  · The hyponatremia somewhat chronic in nature although sodium is slightly below baseline  · Urine studies with urine sodium of less than 10, urine osmolality of 484-all suggestive of reduced effective arterial blood volume in setting of cirrhosis  · Monitor BMP 
· Sodium noted to be 128 on admission  · The hyponatremia somewhat chronic in nature although sodium is slightly below baseline  · Urine studies with urine sodium of less than 10, urine osmolality of 484-all suggestive of reduced effective arterial blood volume in setting of cirrhosis  · Monitor BMP 
crying

## 2023-03-06 NOTE — PLAN OF CARE
Problem: METABOLIC, FLUID AND ELECTROLYTES - ADULT  Goal: Electrolytes maintained within normal limits  Description: INTERVENTIONS:  - Monitor labs and assess patient for signs and symptoms of electrolyte imbalances  - Administer electrolyte replacement as ordered  - Monitor response to electrolyte replacements, including repeat lab results as appropriate  - Instruct patient on fluid and nutrition as appropriate  Outcome: Progressing  Goal: Fluid balance maintained  Description: INTERVENTIONS:  - Monitor labs   - Monitor I/O and WT  - Instruct patient on fluid and nutrition as appropriate  - Assess for signs & symptoms of volume excess or deficit  Outcome: Progressing     Problem: INFECTION - ADULT  Goal: Absence or prevention of progression during hospitalization  Description: INTERVENTIONS:  - Assess and monitor for signs and symptoms of infection  - Monitor lab/diagnostic results  - Monitor all insertion sites, i e  indwelling lines, tubes, and drains  - Monitor endotracheal if appropriate and nasal secretions for changes in amount and color  - [place] appropriate cooling/warming therapies per order  - Administer medications as ordered  - Instruct and encourage patient and family to use good hand hygiene technique  - Identify and instruct in appropriate isolation precautions for identified infection/condition  Outcome: Progressing

## 2023-03-06 NOTE — DISCHARGE INSTR - AVS FIRST PAGE
DISCHARGE INSTRUCTIONS         1  Follow up with Dr Marissa Lewis MD in one week  in regards to recent hospitalization    Follow ups:-  Gastroenterology    New medications  Take nadolol 20 mg daily  Take Prednisone solution 40 mg daily      Discontinued medications  Amlodipine  Atenolol    Labs:-  Do CBC, CMP and PT/INR on 3/10/2023

## 2023-03-06 NOTE — ANESTHESIA PREPROCEDURE EVALUATION
Procedure:  EGD    MELD 20    Relevant Problems   CARDIO   (+) Mixed hyperlipidemia   (+) Primary hypertension      GI/HEPATIC   (+) Decompensated hepatic cirrhosis (HCC)      NEURO/PSYCH   (+) Depression        Physical Exam    Airway    Mallampati score: II  TM Distance: >3 FB  Neck ROM: full     Dental   No notable dental hx     Cardiovascular  Rhythm: regular, Rate: normal, Cardiovascular exam normal    Pulmonary  Pulmonary exam normal Breath sounds clear to auscultation,     Other Findings  Small mouth opening      Anesthesia Plan  ASA Score- 3     Anesthesia Type- IV sedation with anesthesia with ASA Monitors  Additional Monitors:   Airway Plan:     Comment: Discussed risks/benefits, including medication reactions, awareness, aspiration, and serious/life threatening complications  Plan to maintain native airway with IVGA, monitored with EtCO2  Plan Factors-Exercise tolerance (METS): >4 METS  Patient summary reviewed  Patient instructed to abstain from smoking on day of procedure  Patient did not smoke on day of surgery  Induction- intravenous  Postoperative Plan-     Informed Consent- Anesthetic plan and risks discussed with patient  I personally reviewed this patient with the CRNA  Discussed and agreed on the Anesthesia Plan with the CRNA  Alejandro Rose

## 2023-03-06 NOTE — DISCHARGE SUMMARY
[facility]  Discharge- Pattie Gomez 1985, 45 y o  male MRN: [id number]  Unit/Bed#: -02 Encounter: [id number]  Primary Care Provider: Shruthi Bennett MD   Date and time admitted to hospital: 3/3/2023 11:18 AM    * Decompensated hepatic cirrhosis ([address] )  Assessment & Plan  The patient presented with abnormal blood work compatible with cirrhosis including thrombocytopenia, elevated bilirubin, elevated INR  In the setting of alcoholic cirrhosis  Also appears to have ascites on imaging  MELD 20  MDF: 42 (3/3/2023) >     Status post 2300 cc of ascites fluid removed  Evaluation negative for SBP  SAA 5 > Ascites likely due to portal hypertension    He was evaluated by GI and started on prednisone for alcoholic hepatitis    · GI following  Appreciate recommendation  · Plan for EGD on 3/6/2023  · EGD with mild to moderate esophageal varices and portal gastropathy  · Continue prednisone 40 mg daily > outpatient follow-up with gastroenterology regarding discontinuation of prednisone  · Started on nadolol 20 mg on discharge    EGD on 3/6  IMPRESSION:  1   4 columns of grade 2 esophageal varices in the distal esophagus  2  Moderate to severe portal gastropathy, diffuse  3  Food retention in the stomach consistent with gastroparesis        Alcoholic hepatitis with ascites  Assessment & Plan  Initiated on prednisone 40 mg daily given high MDF score  Plan to obtain CBC, CMP and PT/INR on 3/10 -with follow-up with gastroenterology  Alcoholism [facility])  Assessment & Plan  · Patient does endorse alcoholism  · Continue on CIWA protocol  · Continue thiamine and folate replacement  · Strongly advised to cease alcohol intake  Hypokalemia  Assessment & Plan  · Now replaced  Potassium this morning is within normal limit  · Magnesium within normal limit    · Obtain CMP on 3/10    Hyponatremia  Assessment & Plan  · Sodium noted to be 128 on admission  · The hyponatremia somewhat chronic in nature although sodium is slightly below baseline  · Urine studies with urine sodium of less than 10, urine osmolality of 484-all suggestive of reduced effective arterial blood volume in setting of cirrhosis  · Obtain CMP on 3/10    Primary hypertension  Assessment & Plan  On atenolol and amlodipine at home  Now discontinued due to soft blood pressure  EGD with portal gastropathy and esophageal varices  Was initiated on nadolol 20 mg daily  Amlodipine and atenolol discontinued  Will be PCP regarding blood pressure medication modification  · Monitor blood pressure at home    Depression  Assessment & Plan  · Continue Lexapro  Mixed hyperlipidemia  Assessment & Plan  · Continue atorvastatin    Discharging Resident Physician: Hailey Pepper MD  Attending: Gita Krishnamurthy,*  PCP: Chica Aguilar MD  Admission Date: 3/3/2023  Discharge Date: 03/06/23    Disposition:     Home    Reason for Admission: Newly diagnosed decompensated cirrhosis      Consultations During Hospital Stay:  · Gastroenterology    Procedures Performed:     · Paracentesis  · EGD     EGD  FINDINGS:  • Four or more medium and moderate grade II varices (no red demetrius sign) in the lower third of the esophagus; no bleeding was identified  • There is evidence of a moderate to severe portal gastropathy involving the cardia, fundus, body of the stomach  • There is a moderate amount of food debris retained in the stomach consistent with gastroparesis  • The duodenal bulb and 2nd part of the duodenum appeared normal         SPECIMENS:  * No specimens in log *        IMPRESSION:  1   4 columns of grade 2 esophageal varices in the distal esophagus  2  Moderate to severe portal gastropathy, diffuse  3  Food retention in the stomach consistent with gastroparesis     RECOMMENDATION:  1  This patient is a candidate for a nonselective beta-blocker, either nadolol 40 mg p o  daily or carvedilol, up to 12 5 mg daily  2    Resume previous diet         Significant Findings / Test Results:     MRI abdomen w wo contrast and mrcp   Final Result by Ivett Suarez MD (03/05 1117)      Hepatic cirrhosis with portal hypertension as evidenced by moderate to large volume of ascites, and small varices  No MR findings to indicate hepatocellular carcinoma         Workstation performed: [id number]         IR INPATIENT Paracentesis   Final Result by Di Cabello DO (03/03 1543)   Impression: Successful diagnostic and therapeutic paracentesis         Workstation performed: [id number]         CT abdomen pelvis with contrast   Final Result by Kayce Bateman MD (03/03 1322)      1  Cirrhosis with moderate ascites  The liver is diffusely heterogeneous with multiple hypoattenuating nodular foci  Correlation with AFP is recommended  If there is clinical concern, a follow-up nonemergent MRI can be obtained to evaluate for    discrete lesions  2   Mild small small bowel and right/transverse colonic wall thickening, likely related to portal hypertension  3   Distended gallbladder  No stones are visualized and evaluation of the gallbladder wall is limited by surrounding ascites  If there is clinical concern for acute cholecystitis, consider follow-up ultrasound  The study was marked in [facility] for immediate notification  Workstation performed: [id number]         IR INPATIENT Paracentesis    (Results Pending)       Incidental Findings:   · None    Test Results Pending at Discharge (will require follow up): · None     Outpatient Tests Requested:  · None    Complications: None    History of Present Illness:     Rosalba Celis is a 45 y o  male who presents with worsening abdominal distention and also abnormal blood work in the outpatient setting  The patient presented with hyponatremia, hypokalemia, increased bilirubin at 6 4, alk phos is also elevated but only mildly at 100, he is thrombocytopenic with platelets of 773       Hospital Course:     Yolande Martinez is a 45 y o  male patient who originally presented to the hospital on 3/3/2023 due to abnormal labs  Evaluation with imaging was consistent with decompensated cirrhosis with clinical features of portal hypertension  He did have 2300 cc of ascites fluid removed  Analysis was negative for SBP  Due to concern for alcoholic hepatitis with [facility] discriminant function greater than 32, he was started on prednisone  Due to concern for alcohol abuse, he was placed on CIWA protocol for alcohol withdrawal   He did receive a dose of IV benzodiazepine with improvement in withdrawal symptoms  She was CT pelvis did show concern for abnormal foci seen in the liver  For this, MRI abdomen was obtained which was negative for any focal mass concerning for [address]  AFP was within normal limit  He will be evaluated by EGD to rule out varices  EGD showed grade 2 esophageal varices with moderate to severe portal gastropathy noted  He was initiated on nadolol 20 mg daily  Advised to obtain CBC, CMP and PT/INR in the next 4 days with follow-up with gastroenterology  There was plan to obtain repeat paracentesis -however unsuccessful given the location of ascitic fluid  At the time of discharge, patient is strongly advised to quit alcohol  He has been advised to follow-up with gastroenterology as outpatient  Strongly advised follow-up with his PCP regarding this optimization  Patient and significant other at bedside verbalizes understanding of medical advice  Condition at Discharge: stable     Discharge Day Visit / Exam:     Subjective: Patient seen and examined by me at bedside  Reports no new complaints  Feels much better when compared to presentation status  Denies cough, nausea, vomiting, dysuria and leg swelling or pain    Vitals: Blood Pressure: 108/64 (03/06/23 1548)  Pulse: 76 (03/06/23 1548)  Temperature: 98 1 °F (36 7 °C) (03/06/23 1428)  Temp Source: Temporal (03/06/23 1428)  Respirations: 22 (03/06/23 1438)  Height: 6' 1" (185 4 cm) (03/03/23 1729)  Weight - Scale: 99 8 kg (220 lb) (03/03/23 1729)  SpO2: 97 % (03/06/23 1438)  Exam:   Physical Exam  Vitals and nursing note reviewed  Constitutional:       General: He is not in acute distress  Appearance: Normal appearance  He is not toxic-appearing  HENT:      Head: Normocephalic and atraumatic  Mouth/Throat:      Mouth: Mucous membranes are moist    Eyes:      Pupils: Pupils are equal, round, and reactive to light  Cardiovascular:      Rate and Rhythm: Normal rate and regular rhythm  Pulses: Normal pulses  Heart sounds: Normal heart sounds  Pulmonary:      Effort: Pulmonary effort is normal  No respiratory distress  Breath sounds: No stridor  Abdominal:      General: Bowel sounds are normal  There is no distension  Palpations: Abdomen is soft  There is no mass  Tenderness: There is no abdominal tenderness  Musculoskeletal:         General: No swelling or tenderness  Normal range of motion  Cervical back: Normal range of motion and neck supple  Skin:     General: Skin is warm  Neurological:      Mental Status: He is alert and oriented to person, place, and time  Mental status is at baseline  Psychiatric:         Mood and Affect: Mood normal          Behavior: Behavior normal          Thought Content: Thought content normal          Judgment: Judgment normal        Discussion with Family: Significant other    Discharge instructions/Information to patient and family:   See after visit summary for information provided to patient and family  Provisions for Follow-Up Care:  See after visit summary for information related to follow-up care and any pertinent home health orders  Planned Readmission: None     Discharge Medications:  See after visit summary for reconciled discharge medications provided to patient and family        ** Please Note: This note has been constructed using a voice recognition system **

## 2023-03-06 NOTE — PLAN OF CARE
Problem: INFECTION - ADULT  Goal: Absence or prevention of progression during hospitalization  Description: INTERVENTIONS:  - Assess and monitor for signs and symptoms of infection  - Monitor lab/diagnostic results  - Monitor all insertion sites, i e  indwelling lines, tubes, and drains  - Monitor endotracheal if appropriate and nasal secretions for changes in amount and color  - [place] appropriate cooling/warming therapies per order  - Administer medications as ordered  - Instruct and encourage patient and family to use good hand hygiene technique  - Identify and instruct in appropriate isolation precautions for identified infection/condition  Outcome: Progressing     Problem: INFECTION - ADULT  Goal: Absence of fever/infection during neutropenic period  Description: INTERVENTIONS:  - Monitor WBC    Outcome: Progressing

## 2023-03-06 NOTE — ANESTHESIA POSTPROCEDURE EVALUATION
Post-Op Assessment Note    CV Status:  Stable  Pain Score: 0    Pain management: adequate     Mental Status:  Sleepy   Hydration Status:  Euvolemic   PONV Controlled:  Controlled   Airway Patency:  Patent      Post Op Vitals Reviewed: Yes      Staff: CRNA         No notable events documented      BP (!) 84/56 (03/06/23 1428)    Temp 98 1 °F (36 7 °C) (03/06/23 1428)    Pulse 75 (03/06/23 1428)   Resp 13 (03/06/23 1428)    SpO2 92 % (03/06/23 1428)

## 2023-03-07 ENCOUNTER — TRANSITIONAL CARE MANAGEMENT (OUTPATIENT)
Dept: INTERNAL MEDICINE CLINIC | Facility: CLINIC | Age: 38
End: 2023-03-07

## 2023-03-07 ENCOUNTER — TELEPHONE (OUTPATIENT)
Dept: GASTROENTEROLOGY | Facility: CLINIC | Age: 38
End: 2023-03-07

## 2023-03-07 NOTE — TELEPHONE ENCOUNTER
Patients GI provider:  Dr Serena Desir    Number to return call: [phone]    Reason for call: Pt 's wife calling to schedule 1 week fu from [place]  Nothing available til 3/23/22  Please call if you have sooner appt      Scheduled procedure/appointment date if applicable: Apt/procedure 3/23/23

## 2023-03-07 NOTE — UTILIZATION REVIEW
NOTIFICATION OF ADMISSION DISCHARGE   This is a Notification of Discharge from [facility]  Please be advised that this patient has been discharge from our facility  Below you will find the admission and discharge date and time including the patient’s disposition  UTILIZATION REVIEW CONTACT:  Marleni Recio  Utilization   Network Utilization Review Department  Phone: [phone] x carefully listen to the prompts  All voicemails are confidential   Email: [email or web address]     ADMISSION INFORMATION  PRESENTATION DATE: 3/3/2023 11:18 AM  OBERVATION ADMISSION DATE:   INPATIENT ADMISSION DATE: 3/3/23  1:35 PM   DISCHARGE DATE: 3/6/2023  6:02 PM   DISPOSITION:Home/Self Care    IMPORTANT INFORMATION:  Send all requests for admission clinical reviews, approved or denied determinations and any other requests to dedicated fax number below belonging to the campus where the patient is receiving treatment   List of dedicated fax numbers:  [address] DENIALS (Administrative/Medical Necessity) [phone]   [address] (Maternity/NICU/Pediatrics) [phone]   [facility] [phone]   [place] [address] [phone]   [address] [phone]   [address] [facility] [phone] [address] [facility] [phone]   [address] [place] [address] [phone]   [facility]  [phone]   [address] [facility] [phone]   [address] [facility] [address] [facility] [phone]

## 2023-03-08 ENCOUNTER — APPOINTMENT (OUTPATIENT)
Dept: LAB | Facility: HOSPITAL | Age: 38
End: 2023-03-08

## 2023-03-08 DIAGNOSIS — K72.90 HEPATIC NECROSIS (HCC): ICD-10-CM

## 2023-03-08 DIAGNOSIS — K74.60 HEPATIC CIRRHOSIS, UNSPECIFIED HEPATIC CIRRHOSIS TYPE, UNSPECIFIED WHETHER ASCITES PRESENT (HCC): ICD-10-CM

## 2023-03-08 LAB
ALBUMIN SERPL BCP-MCNC: 2.8 G/DL (ref 3.5–5)
ALP SERPL-CCNC: 112 U/L (ref 34–104)
ALT SERPL W P-5'-P-CCNC: 44 U/L (ref 7–52)
ANION GAP SERPL CALCULATED.3IONS-SCNC: 8 MMOL/L (ref 4–13)
AST SERPL W P-5'-P-CCNC: 62 U/L (ref 13–39)
BASOPHILS # BLD AUTO: 0.03 THOUSANDS/ÂΜL (ref 0–0.1)
BASOPHILS NFR BLD AUTO: 0 % (ref 0–1)
BILIRUB SERPL-MCNC: 3.84 MG/DL (ref 0.2–1)
BUN SERPL-MCNC: 12 MG/DL (ref 5–25)
CALCIUM ALBUM COR SERPL-MCNC: 10 MG/DL (ref 8.3–10.1)
CALCIUM SERPL-MCNC: 9 MG/DL (ref 8.4–10.2)
CHLORIDE SERPL-SCNC: 102 MMOL/L (ref 96–108)
CO2 SERPL-SCNC: 25 MMOL/L (ref 21–32)
CREAT SERPL-MCNC: 0.74 MG/DL (ref 0.6–1.3)
EOSINOPHIL # BLD AUTO: 0.04 THOUSAND/ÂΜL (ref 0–0.61)
EOSINOPHIL NFR BLD AUTO: 0 % (ref 0–6)
ERYTHROCYTE [DISTWIDTH] IN BLOOD BY AUTOMATED COUNT: 17.7 % (ref 11.6–15.1)
GFR SERPL CREATININE-BSD FRML MDRD: 117 ML/MIN/1.73SQ M
GLUCOSE P FAST SERPL-MCNC: 76 MG/DL (ref 65–99)
HCT VFR BLD AUTO: 43.3 % (ref 36.5–49.3)
HGB BLD-MCNC: 15 G/DL (ref 12–17)
IMM GRANULOCYTES # BLD AUTO: 0.03 THOUSAND/UL (ref 0–0.2)
IMM GRANULOCYTES NFR BLD AUTO: 0 % (ref 0–2)
INR PPP: 1.66 (ref 0.84–1.19)
LYMPHOCYTES # BLD AUTO: 2.65 THOUSANDS/ÂΜL (ref 0.6–4.47)
LYMPHOCYTES NFR BLD AUTO: 27 % (ref 14–44)
MCH RBC QN AUTO: 33.6 PG (ref 26.8–34.3)
MCHC RBC AUTO-ENTMCNC: 34.6 G/DL (ref 31.4–37.4)
MCV RBC AUTO: 97 FL (ref 82–98)
MONOCYTES # BLD AUTO: 1.36 THOUSAND/ÂΜL (ref 0.17–1.22)
MONOCYTES NFR BLD AUTO: 14 % (ref 4–12)
NEUTROPHILS # BLD AUTO: 5.81 THOUSANDS/ÂΜL (ref 1.85–7.62)
NEUTS SEG NFR BLD AUTO: 59 % (ref 43–75)
NRBC BLD AUTO-RTO: 0 /100 WBCS
PLATELET # BLD AUTO: 132 THOUSANDS/UL (ref 149–390)
PMV BLD AUTO: 12.2 FL (ref 8.9–12.7)
POTASSIUM SERPL-SCNC: 3.4 MMOL/L (ref 3.5–5.3)
PROT SERPL-MCNC: 7.5 G/DL (ref 6.4–8.4)
PROTHROMBIN TIME: 19.3 SECONDS (ref 11.6–14.5)
RBC # BLD AUTO: 4.47 MILLION/UL (ref 3.88–5.62)
SODIUM SERPL-SCNC: 135 MMOL/L (ref 135–147)
WBC # BLD AUTO: 9.92 THOUSAND/UL (ref 4.31–10.16)

## 2023-03-15 ENCOUNTER — OFFICE VISIT (OUTPATIENT)
Dept: INTERNAL MEDICINE CLINIC | Facility: CLINIC | Age: 38
End: 2023-03-15

## 2023-03-15 VITALS
BODY MASS INDEX: 29.29 KG/M2 | RESPIRATION RATE: 18 BRPM | WEIGHT: 221 LBS | HEIGHT: 73 IN | SYSTOLIC BLOOD PRESSURE: 132 MMHG | HEART RATE: 93 BPM | OXYGEN SATURATION: 97 % | TEMPERATURE: 98.5 F | DIASTOLIC BLOOD PRESSURE: 88 MMHG

## 2023-03-15 DIAGNOSIS — K72.90 DECOMPENSATED HEPATIC CIRRHOSIS (HCC): Primary | ICD-10-CM

## 2023-03-15 DIAGNOSIS — K70.11 ALCOHOLIC HEPATITIS WITH ASCITES: ICD-10-CM

## 2023-03-15 DIAGNOSIS — F10.20 ALCOHOLISM (HCC): ICD-10-CM

## 2023-03-15 DIAGNOSIS — F32.A DEPRESSION, UNSPECIFIED DEPRESSION TYPE: ICD-10-CM

## 2023-03-15 DIAGNOSIS — I10 PRIMARY HYPERTENSION: ICD-10-CM

## 2023-03-15 DIAGNOSIS — K74.60 DECOMPENSATED HEPATIC CIRRHOSIS (HCC): Primary | ICD-10-CM

## 2023-03-15 PROBLEM — E87.1 HYPONATREMIA: Status: RESOLVED | Noted: 2023-03-03 | Resolved: 2023-03-15

## 2023-03-15 PROBLEM — E87.6 HYPOKALEMIA: Status: RESOLVED | Noted: 2023-03-03 | Resolved: 2023-03-15

## 2023-03-15 RX ORDER — PREDNISOLONE SODIUM PHOSPHATE 15 MG/5ML
40 SOLUTION ORAL DAILY
Qty: 93.1 ML | Refills: 0 | Status: SHIPPED | OUTPATIENT
Start: 2023-03-15 | End: 2023-03-21 | Stop reason: SDUPTHER

## 2023-03-15 RX ORDER — ESCITALOPRAM OXALATE 10 MG/1
10 TABLET ORAL DAILY
Qty: 90 TABLET | Refills: 3 | Status: SHIPPED | OUTPATIENT
Start: 2023-03-15 | End: 2023-06-13

## 2023-03-15 RX ORDER — PREDNISOLONE SODIUM PHOSPHATE 15 MG/5ML
40 SOLUTION ORAL DAILY
Qty: 93.1 ML | Refills: 0 | Status: CANCELLED | OUTPATIENT
Start: 2023-03-15 | End: 2023-03-22

## 2023-03-15 RX ORDER — NADOLOL 20 MG/1
20 TABLET ORAL DAILY
Qty: 90 TABLET | Refills: 3 | Status: SHIPPED | OUTPATIENT
Start: 2023-03-15

## 2023-03-15 RX ORDER — FOLIC ACID 1 MG/1
1 TABLET ORAL DAILY
Qty: 30 TABLET | Refills: 3 | Status: SHIPPED | OUTPATIENT
Start: 2023-03-15 | End: 2023-04-14

## 2023-03-15 RX ORDER — LANOLIN ALCOHOL/MO/W.PET/CERES
100 CREAM (GRAM) TOPICAL DAILY
Qty: 30 TABLET | Refills: 3 | Status: SHIPPED | OUTPATIENT
Start: 2023-03-15 | End: 2023-04-14

## 2023-03-15 NOTE — PROGRESS NOTES
Assessment & Plan     1  Decompensated hepatic cirrhosis (HCC)  -     CBC and differential; Future  -     Comprehensive metabolic panel; Future  -     CBC and differential  -     Comprehensive metabolic panel  -     prednisoLONE (ORAPRED) 15 mg/5 mL oral solution; Take 13 3 mL (40 mg total) by mouth daily for 7 days  -     IR paracentesis; Future; Expected date: 03/15/2023  -     nadolol (CORGARD) 20 mg tablet; Take 1 tablet (20 mg total) by mouth daily    2  Primary hypertension    3  Alcoholism ([address] )  -     thiamine 100 MG tablet; Take 1 tablet (100 mg total) by mouth daily  -     folic acid (FOLVITE) 1 mg tablet; Take 1 tablet (1 mg total) by mouth daily    4  Alcoholic hepatitis with ascites  -     IR paracentesis; Future; Expected date: 03/15/2023    5  Depression, unspecified depression type  -     escitalopram (LEXAPRO) 10 mg tablet; Take 1 tablet (10 mg total) by mouth daily      BMI Counseling: Body mass index is 29 16 kg/m²  The BMI is above normal  Nutrition recommendations include decreasing portion sizes and encouraging healthy choices of fruits and vegetables  Exercise recommendations include moderate physical activity 150 minutes/week  Rationale for BMI follow-up plan is due to patient being overweight or obese  Tobacco Cessation Counseling: Tobacco cessation counseling was provided  The patient is sincerely urged to quit consumption of tobacco  He is not ready to quit tobacco        Subjective     Transitional Care Management Review:   Melissa Azar is a 45 y o  male here for TCM follow up       During the TCM phone call patient stated:  TCM Call     Date and time call was made  3/7/2023 11:49 AM    Hospital care reviewed  Records reviewed    Patient was hospitialized at  [facility]    Date of Admission  03/03/23    Date of discharge  03/06/23    Diagnosis  Decompensated hepatic cirrhosis    Disposition  Home    Were the patients medications reviewed and updated  Yes    Current Symptoms Lower abdominal pain    Lower abdominal pain severity  Mild      TCM Call     Post hospital issues  Reduced activity    Should patient be enrolled in anticoag monitoring? No    Scheduled for follow up? Yes    Patients specialists  Other (comment)    Other specialists names  Lenny Norwood    Did you obtain your prescribed medications  Yes    Do you need help managing your prescriptions or medications  No    Is transportation to your appointment needed  No    I have advised the patient to call PCP with any new or worsening symptoms  Rushie Neighbor, LPN    Living Arrangements  Family members; Spouse or Significiant other    Are you recieving any outpatient services  No    Are you recieving home care services  No    Are you using any community resources  No    Current waiver services  No    Have you fallen in the last 12 months  No    Interperter language line needed  No    Counseling  Patient    Counseling topics  Activities of daily living; Importance of RX compliance; Diagnostic results        Patient is here for [facility] visit; he is an alcoholic and presented to the ER with abnormal labs; Evaluation with imaging was consistent with decompensated cirrhosis with clinical features of portal hypertension  2 3 L of ascitic fluid removed and negative for SBP  He was started on prednisolone for alcoholic hepatitis and placed on [facility] protocol  CT pelvis did show concern for abnormal foci seen in the liver  For this, MRI abdomen was obtained which was negative for any focal mass [facility]  AFP was within normal limit  EGD showed grade 2 esophageal varices with moderate to severe portal gastropathy noted  He was initiated on nadolol 20 mg daily   Discharged with directions to stop BP med's; he says he has not had an alcoholic drink since going to the hospital; declining help with drinking or information about inpatient or outpatient rehab; has the support of his family who is in [place]; he is  and had his mother in law bring him here today  Review of Systems   Constitutional: Positive for activity change  Gastrointestinal: Positive for abdominal distention and abdominal pain  All other systems reviewed and are negative  Objective     /88 (BP Location: Left arm, Patient Position: Sitting, Cuff Size: Standard)   Pulse 93   Temp 98 5 °F (36 9 °C) (Tympanic)   Resp 18   Ht 6' 1" (1 854 m)   Wt 100 kg (221 lb)   SpO2 97%   BMI 29 16 kg/m²      Physical Exam  Vitals and nursing note reviewed  Constitutional:       Appearance: Normal appearance  HENT:      Head: Normocephalic and atraumatic  Cardiovascular:      Rate and Rhythm: Normal rate and regular rhythm  Heart sounds: Normal heart sounds  Pulmonary:      Effort: Pulmonary effort is normal       Breath sounds: Normal breath sounds  Abdominal:      General: Bowel sounds are normal  There is distension  Palpations: There is no mass  Tenderness: There is no abdominal tenderness  Hernia: No hernia is present  Musculoskeletal:         General: Normal range of motion  Cervical back: Normal range of motion  Right lower leg: No edema  Left lower leg: No edema  Skin:     General: Skin is warm and dry  Findings: Bruising present  Neurological:      General: No focal deficit present  Mental Status: He is alert and oriented to person, place, and time  Mental status is at baseline        Gait: Gait abnormal    Psychiatric:         Mood and Affect: Mood normal        Medications have been reviewed by provider in current encounter    Zena Mckenzie MD

## 2023-03-17 ENCOUNTER — HOSPITAL ENCOUNTER (OUTPATIENT)
Dept: NON INVASIVE DIAGNOSTICS | Facility: HOSPITAL | Age: 38
Discharge: HOME | End: 2023-03-17
Attending: INTERNAL MEDICINE | Admitting: RADIOLOGY

## 2023-03-17 VITALS — OXYGEN SATURATION: 99 % | SYSTOLIC BLOOD PRESSURE: 130 MMHG | DIASTOLIC BLOOD PRESSURE: 74 MMHG

## 2023-03-17 DIAGNOSIS — K70.11 ALCOHOLIC HEPATITIS WITH ASCITES: ICD-10-CM

## 2023-03-17 DIAGNOSIS — K74.60 DECOMPENSATED HEPATIC CIRRHOSIS (HCC): ICD-10-CM

## 2023-03-17 DIAGNOSIS — K72.90 DECOMPENSATED HEPATIC CIRRHOSIS (HCC): ICD-10-CM

## 2023-03-17 RX ORDER — ALBUMIN (HUMAN) 12.5 G/50ML
SOLUTION INTRAVENOUS
Status: COMPLETED | OUTPATIENT
Start: 2023-03-17 | End: 2023-03-17

## 2023-03-17 RX ORDER — LIDOCAINE HYDROCHLORIDE 10 MG/ML
INJECTION, SOLUTION EPIDURAL; INFILTRATION; INTRACAUDAL; PERINEURAL AS NEEDED
Status: COMPLETED | OUTPATIENT
Start: 2023-03-17 | End: 2023-03-17

## 2023-03-17 RX ADMIN — ALBUMIN (HUMAN) 50 G: 5 SOLUTION INTRAVENOUS at 11:09

## 2023-03-17 RX ADMIN — LIDOCAINE HYDROCHLORIDE 10 ML: 10 INJECTION, SOLUTION EPIDURAL; INFILTRATION; INTRACAUDAL; PERINEURAL at 10:18

## 2023-03-17 NOTE — BRIEF OP NOTE (RAD/CATH)
IR PARACENTESIS Procedure Note    PATIENT NAME: Helder Werner  : 1985  MRN: [id number]    Pre-op Diagnosis:   1  Alcoholic hepatitis with ascites    2  Decompensated hepatic cirrhosis (HCC)      Post-op Diagnosis:   1  Alcoholic hepatitis with ascites    2  Decompensated hepatic cirrhosis [facility])        Surgeon:   Gunnar Powers  Assistants:     No qualified resident was available, Resident is only observing    Estimated Blood Loss: minimal  Findings: 5950 ml clear yellow ascites fluid, RUQ  Albumin 50g infused      Specimens: none    Complications:  None immediate    Anesthesia: local    Katherin Locket     Date: 3/17/2023  Time: 12:39 PM

## 2023-03-21 DIAGNOSIS — K72.90 DECOMPENSATED HEPATIC CIRRHOSIS (HCC): ICD-10-CM

## 2023-03-21 DIAGNOSIS — K74.60 DECOMPENSATED HEPATIC CIRRHOSIS (HCC): ICD-10-CM

## 2023-03-21 RX ORDER — PREDNISOLONE SODIUM PHOSPHATE 15 MG/5ML
40 SOLUTION ORAL DAILY
Qty: 93.1 ML | Refills: 0 | Status: SHIPPED | OUTPATIENT
Start: 2023-03-21 | End: 2023-03-23 | Stop reason: SDUPTHER

## 2023-03-23 ENCOUNTER — OFFICE VISIT (OUTPATIENT)
Dept: GASTROENTEROLOGY | Facility: CLINIC | Age: 38
End: 2023-03-23

## 2023-03-23 VITALS
SYSTOLIC BLOOD PRESSURE: 140 MMHG | WEIGHT: 210.6 LBS | DIASTOLIC BLOOD PRESSURE: 84 MMHG | HEIGHT: 73 IN | HEART RATE: 91 BPM | OXYGEN SATURATION: 96 % | BODY MASS INDEX: 27.91 KG/M2

## 2023-03-23 DIAGNOSIS — K70.31 ASCITES DUE TO ALCOHOLIC CIRRHOSIS (HCC): Primary | ICD-10-CM

## 2023-03-23 DIAGNOSIS — K74.60 DECOMPENSATED HEPATIC CIRRHOSIS (HCC): ICD-10-CM

## 2023-03-23 DIAGNOSIS — I85.00 ESOPHAGEAL VARICES WITHOUT BLEEDING, UNSPECIFIED ESOPHAGEAL VARICES TYPE (HCC): ICD-10-CM

## 2023-03-23 DIAGNOSIS — K72.90 DECOMPENSATED HEPATIC CIRRHOSIS (HCC): ICD-10-CM

## 2023-03-23 RX ORDER — PREDNISOLONE SODIUM PHOSPHATE 15 MG/5ML
40 SOLUTION ORAL DAILY
Qty: 93.1 ML | Refills: 0 | Status: SHIPPED | OUTPATIENT
Start: 2023-03-23 | End: 2023-03-28 | Stop reason: SDUPTHER

## 2023-03-23 NOTE — PROGRESS NOTES
Answers for HPI/ROS submitted by the patient on 3/23/2023  Chronicity: new  Onset: 1 to 4 weeks ago  Onset quality: sudden  Frequency: every several days  Progression since onset: gradually improving  Pain location: LLQ, epigastric region  Pain - numeric: 3/10  Pain quality: burning, cramping  Radiates to: LLQ, epigastric region  anorexia: No  arthralgias: No  belching: Yes  constipation: No  diarrhea: Yes  dysuria: No  fever: No  flatus: No  frequency: Yes  headaches: Yes  hematochezia: No  hematuria: No  melena: No  myalgias: No  nausea: Yes  weight loss: Yes  vomiting: Yes  Aggravated by: certain positions  Relieved by: activity, belching, passing flatus, recumbency  Diagnostic workup: ultrasound    [facility] - Outpatient Follow-up Note  Herminia Level 45 y o  male MRN: [id number]  Encounter: [id number]          ASSESSMENT AND PLAN:      1  Decompensated hepatic cirrhosis ([address] )  2  Ascites due to alcoholic cirrhosis (HCC)  3  Esophageal varices without bleeding, unspecified esophageal varices type [facility])  -Patient was admitted at [facility] from March 3 to March 6  Initial laboratories from admission show a discriminant function of greater than 38 and a MELD score of 26  Patient had been placed on a prednisolone course and had been responding well  Will update laboratories at this time  There was no Lille score calculated at day 7; will complete full 28-day course    -Ascites: Patient has now undergone 3 paracenteses  No evidence of SBP  Patient is on a less than 2 g salt restricted diet  Patient does report that his ascitic fluid does seem to be less than it had been at hospital discharge  No diuretics were started at the time of patient's hospital discharge due to his hyponatremia    Again laboratories will be updated at this time     -Hepatic encephalopathy: Patient is alert and oriented x3 on examination     -Varices: Patient's EGD from 3/6/2023 did show evidence of 4 columns of grade 2 esophageal varices  Patient is currently on nadolol 20 mg daily; this may need to be increased to 40 mg daily  -HCC: MRI with and without contrast showed no lesions in the liver  Patient's alpha-fetoprotein was 7 5     -Transplant: Patient has never been evaluated by transplant team   Patient will have a follow-up appointment with Dr Eric Rizzo on May 8     -Encouraged continued complete alcohol cessation  Patient does report that he does have a good support system and does not need any additional services for his alcohol abuse   ______________________________________________________________________    SUBJECTIVE:    77-year-old male with a past medical history significant for CVA, hypertension, hyperlipidemia, tobacco abuse, history of alcohol abuse who presented to the [place] [address] emergency department on March 3, 2023 with elevated liver enzymes  GI was consulted at that time for newly diagnosed cirrhosis secondary to alcohol  Patient was evaluated by the GI team and a full liver work-up was performed as well as an EGD  Patient's MELD score on admission was 26 and his discriminant function was 38  Patient had been placed on prednisolone and is currently still completing that prescription  There was no Lille score calculated on day 7 due to the fact the patient did not have a follow-up appointment until today  Patient reports that his last alcoholic beverage was on March 1, 2023  Patient is status post paracentesis x3  Patient's first paracentesis on 3/3/2023 removed 2300 cc of clear yellow fluid  Patient's cytology was negative for SBP  Last week with over 5 L of fluid was removed  Patient does report that he feels that his abdominal distention has improved significantly  Patient denies any severe abdominal pain  Patient does report nausea but no vomiting  Patient reports that he is trying to work on increasing his diet  He is watching his salt intake    He is trying to drink plenty of water throughout the course of the day  He reports that he is going to start exercising again  Patient does report that he has a longstanding history of alcohol abuse  He reports a decade of alcohol consumption  He reports that most recently he had been drinking 2 40s a day  Patient's EGD was performed on 3/6/2023  Patient did have 4 columns of grade 2 esophageal varices in the distal esophagus  There was evidence of moderate to severe portal hypertensive gastropathy  Patient was started on nadolol 20 mg daily  Patient's TORITO from hospital admission was negative  Patient's anti-smooth muscle antibody as well as antimitochondrial antibody again were negative  Patient's hepatitis panel was negative  Patient's alpha 1 antitrypsin was 158  Patient ceruloplasmin level was 11 2  Patients alpha-fetoprotein was normal     Patient CT abdomen pelvis from 3/3/2023 performed with IV contrast showed cirrhosis with moderate ascites  There was a small amount of colonic wall thickening  Distended gallbladder  Patient's MRI with and without contrast and MRCP from 3/5/2023 showed hepatic cirrhosis with portal hypertension  Moderate to large amount of ascites  No MR findings to indicate hepatocellular carcinoma  Patient has never been evaluated by a transplant team     REVIEW OF SYSTEMS IS OTHERWISE NEGATIVE        Historical Information   Past Medical History:   Diagnosis Date   • Hyperlipidemia    • Hypertension    • Stroke [facility])      Past Surgical History:   Procedure Laterality Date   • IR PARACENTESIS  3/3/2023   • IR PARACENTESIS  3/6/2023   • IR PARACENTESIS  3/17/2023     Social History   Social History     Substance and Sexual Activity   Alcohol Use Yes   • Alcohol/week: 6 0 standard drinks   • Types: 6 Cans of beer per week     Social History     Substance and Sexual Activity   Drug Use Never     Social History     Tobacco Use   Smoking Status Never   • Passive exposure: Never Smokeless Tobacco Current   Tobacco Comments    Patient states he is ready for tobacco cessation     Family History   Problem Relation Age of Onset   • Lymphoma Mother    • Heart attack Mother    • Hypertension Mother    • Hypertension Father        Meds/Allergies       Current Outpatient Medications:   •  atorvastatin (LIPITOR) 40 mg tablet  •  escitalopram (LEXAPRO) 10 mg tablet  •  folic acid (FOLVITE) 1 mg tablet  •  nadolol (CORGARD) 20 mg tablet  •  prednisoLONE (ORAPRED) 15 mg/5 mL oral solution  •  thiamine 100 MG tablet  •  traZODone (DESYREL) 50 mg tablet    No Known Allergies        Objective     Blood pressure 140/84, pulse 91, height 6' 1" (1 854 m), weight 95 5 kg (210 lb 9 6 oz), SpO2 96 %  Body mass index is 27 79 kg/m²  PHYSICAL EXAM:      General Appearance:   Alert, cooperative, no distress   HEENT:   Normocephalic, atraumatic, anicteric      Neck:  Supple, symmetrical, trachea midline   Lungs:   Clear to auscultation bilaterally; no rales, rhonchi or wheezing; respirations unlabored    Heart[de-identified]   Regular rate and rhythm; no murmur, rub, or gallop  Abdomen:   Soft, non-tender, non-distended; normal bowel sounds; no masses, no organomegaly    Genitalia:   Deferred    Rectal:   Deferred    Extremities:  No cyanosis, clubbing or edema    Pulses:  2+ and symmetric    Skin:  No jaundice, rashes, or lesions    Lymph nodes:  No palpable cervical lymphadenopathy        Lab Results:   No visits with results within 1 Day(s) from this visit     Latest known visit with results is:   Appointment on 03/08/2023   Component Date Value   • WBC 03/08/2023 9 92    • RBC 03/08/2023 4 47    • Hemoglobin 03/08/2023 15 0    • Hematocrit 03/08/2023 43 3    • MCV 03/08/2023 97    • MCH 03/08/2023 33 6    • MCHC 03/08/2023 34 6    • RDW 03/08/2023 17 7 (H)    • MPV 03/08/2023 12 2    • Platelets 35/66/2850 132 (L)    • nRBC 03/08/2023 0    • Neutrophils Relative 03/08/2023 59    • Immat GRANS % 03/08/2023 0    • Lymphocytes Relative 03/08/2023 27    • Monocytes Relative 03/08/2023 14 (H)    • Eosinophils Relative 03/08/2023 0    • Basophils Relative 03/08/2023 0    • Neutrophils Absolute 03/08/2023 5 81    • Immature Grans Absolute 03/08/2023 0 03    • Lymphocytes Absolute 03/08/2023 2 65    • Monocytes Absolute 03/08/2023 1 36 (H)    • Eosinophils Absolute 03/08/2023 0 04    • Basophils Absolute 03/08/2023 0 03    • Sodium 03/08/2023 135    • Potassium 03/08/2023 3 4 (L)    • Chloride 03/08/2023 102    • CO2 03/08/2023 25    • ANION GAP 03/08/2023 8    • BUN 03/08/2023 12    • Creatinine 03/08/2023 0 74    • Glucose, Fasting 03/08/2023 76    • Calcium 03/08/2023 9 0    • Corrected Calcium 03/08/2023 10 0    • AST 03/08/2023 62 (H)    • ALT 03/08/2023 44    • Alkaline Phosphatase 03/08/2023 112 (H)    • Total Protein 03/08/2023 7 5    • Albumin 03/08/2023 2 8 (L)    • Total Bilirubin 03/08/2023 3 84 (H)    • eGFR 03/08/2023 117    • Protime 03/08/2023 19 3 (H)    • INR 03/08/2023 1 66 (H)          Radiology Results:   EGD    Result Date: 3/6/2023  Narrative: Table formatting from the original result was not included  [address] [place] [address] [id number] [phone] DATE OF SERVICE: 3/06/23 PHYSICIAN(S): Attending: Lisa Noguera DO Fellow: No Staff Documented INDICATION: Alcoholism ([facility] ), Cirrhosis ([facility] ) POST-OP DIAGNOSIS: See the impression below  PREPROCEDURE: Informed consent was obtained for the procedure, including sedation  Risks of perforation, hemorrhage, adverse drug reaction and aspiration were discussed  The patient was placed in the left lateral decubitus position  Patient was explained about the risks and benefits of the procedure  Risks including but not limited to bleeding, infection, and perforation were explained in detail  Also explained about less than 100% sensitivity with the exam and other alternatives   PROCEDURE: EGD DETAILS OF PROCEDURE: Patient was taken to the procedure room where a time out was performed to confirm correct patient and correct procedure  The patient underwent monitored anesthesia care, which was administered by an anesthesia professional  The patient's blood pressure, heart rate, level of consciousness, respirations and oxygen were monitored throughout the procedure  The scope was advanced to the second part of the duodenum  Retroflexion was performed in the fundus  Prior to the procedure, the patient's H  Pylori status was unknown  The patient experienced no blood loss  The procedure was not difficult  The patient tolerated the procedure well  There were no apparent complications  ANESTHESIA INFORMATION: ASA: III Anesthesia Type: Anesthesia type not filed in the log  MEDICATIONS: No administrations occurring from 1413 to 1423 on 03/06/23 FINDINGS: Four or more medium and moderate grade II varices (no red demetrius sign) in the lower third of the esophagus; no bleeding was identified There is evidence of a moderate to severe portal gastropathy involving the cardia, fundus, body of the stomach  There is a moderate amount of food debris retained in the stomach consistent with gastroparesis  The duodenal bulb and 2nd part of the duodenum appeared normal  SPECIMENS: * No specimens in log *     Impression: 1   4 columns of grade 2 esophageal varices in the distal esophagus 2  Moderate to severe portal gastropathy, diffuse 3  Food retention in the stomach consistent with gastroparesis RECOMMENDATION: 1  This patient is a candidate for a nonselective beta-blocker, either nadolol 40 mg p o  daily or carvedilol, up to 12 5 mg daily 2  Resume previous diet  DO Juan José Baca [facility] paracentesis    Result Date: 3/17/2023  Narrative: Ultrasound-guided paracentesis Clinical History: Alcoholic hepatitis with symptomatic recurrent ascites Procedure: After explaining the risks and benefits of the procedure to the patient, informed consent was obtained  Ultrasound used to localize the right upper quadrant ascites  The overlying skin was prepped and draped in usual sterile fashion and local anesthesia was obtained with the 1% lidocaine solution  A 5 Western Reyna Yueh needle was advanced until fluid was aspirated under live ultrasound guidance  Approximately 5950 cc' s of clear, yellow fluid was aspirated  No specimen was sent to the lab  Albumin 50 g was infused  The patient tolerated the procedure well and left the department in stable condition  Impression: Impression: Ultrasound-guided paracentesis  Signed, performed, and dictated by Jeanette Contreras under the supervision of Dr Ernesto Diamond  Workstation performed: [id number]     IR INPATIENT Paracentesis    Result Date: 3/6/2023  Narrative: PROCEDURE: Limited ultrasound to evaluate for ascites NUMBER OF IMAGES: Multiple INDICATION: Concern for ascites  PROCEDURE: Limited four quadrant ultrasound was performed to evaluate for ascites  FINDINGS: Limited four quadrant ultrasound failed to identify a significant intra-abdominal fluid collection suitable for aspiration  Perihepatic fluid was seen with the probe in an intercostal location  The decision to not perform a paracentesis through an intercostal approach was made as there is an increased risk for pneumothorax and pleural effusion  Impression: Limited ultrasound demonstrating absence of significant ascites suitable for aspiration  Perihepatic fluid was seen an intercostal location  No paracentesis was performed through an intercostal approach due to increased risk for pneumothorax and pleural effusion  Signed, performed, and dictated by Jeanette Contreras under the supervision of Dr Charo Rockwell   Workstation performed: [id number]     IR INPATIENT Paracentesis    Result Date: 3/3/2023  Narrative: Paracentesis with ultrasound guidance 3/3/2023 History: Ascites Contrast: None Fluoroscopy time: None Number of images: 1 Radiation dose: 0 mGy Conscious sedation time: Not applicable Technique: The patient was identified verbally, and by wrist band " Time out" was performed  Informed consent was obtained  Following obtaining informed consent, the overlying skin was prepped and draped in usual sterile fashion  Lidocaine was given as local anesthesia  A 5 Western Reyna Yueh centesis needle was placed using ultrasound guidance  2300 cc of clear yellow colored fluid was removed  The patient tolerated procedure without apparent immediate complications or complaints  Dr Orlando Herrmann performed the procedure at the patient's bedside in the emergency department Findings: Free fluid is seen in the abdomen  Impression: Impression: Successful diagnostic and therapeutic paracentesis Workstation performed: [id number]     MRI abdomen w wo contrast and mrcp    Result Date: 3/5/2023  Narrative: MRI OF THE ABDOMEN WITH AND WITHOUT CONTRAST WITH MRCP INDICATION: 45 years / Male  newly diagnosed cirrhosis, hypoattenuation in the liver, rule out hepatocellular carcinoma  COMPARISON: CT performed March 3, 2023 TECHNIQUE:  Multiplanar/multisequence MRI of the abdomen with 3D MRCP was performed before and after administration of contrast  IV Contrast:  9 mL of Gadobutrol injection (SINGLE-DOSE) FINDINGS: LOWER CHEST:   Elevation of hemidiaphragms more notable on the right than the left with bibasilar atelectasis  LIVER: Nodular contours and heterogeneous enhancement consistent with cirrhotic morphology  No arterial phase hyperenhancement or diffusion restriction in the liver and most notably those findings are not seen in the most heterogeneously enhancing area in posterior dome of right hepatic lobe  Few tiny scattered intrahepatic cysts such as a 5 mm cyst in the posterior dome of right hepatic lobe on image 45 of series 12  No MR findings to indicate discrete or infiltrating hepatocellular carcinoma  Portal and hepatic veins are patent   BILE DUCTS:  No intrahepatic or extrahepatic bile duct dilation  Common bile duct is normal in caliber  No choledocholithiasis, biliary stricture or suspicious mass  GALLBLADDER:  Boehler's distended  Mild gallbladder wall thickening which is a nonspecific finding that is often seen in the setting of hepatic parenchymal disease  No gallstones  PANCREAS:  Unremarkable  ADRENAL GLANDS:  Normal  SPLEEN:  Normal  KIDNEYS/PROXIMAL URETERS:  No hydroureteronephrosis  Small renal cortical cysts  No suspicious renal mass  BOWEL:   No dilated loops of bowel  PERITONEUM/RETROPERITONEUM:  Moderate to large volume of abdominopelvic ascites reidentified  LYMPH NODES:  No abdominal lymphadenopathy  VASCULAR STRUCTURES:  Small left gastric, perisplenic, and mesenteric varices noted  ABDOMINAL WALL:  Unremarkable  OSSEOUS STRUCTURES:  No suspicious osseous lesion  Impression: Hepatic cirrhosis with portal hypertension as evidenced by moderate to large volume of ascites, and small varices  No MR findings to indicate hepatocellular carcinoma    Workstation performed: [id number]     CT abdomen pelvis with contrast    Result Date: 3/3/2023  Narrative: CT ABDOMEN AND PELVIS WITH IV CONTRAST INDICATION:   Lower abdominal pain, elevated bilirubin  COMPARISON:  None  TECHNIQUE:  CT examination of the abdomen and pelvis was performed  Axial, sagittal, and coronal 2D reformatted images were created from the source data and submitted for interpretation  Radiation dose length product (DLP) for this visit:  829 mGy-cm   This examination, like all CT scans performed in the [facility], was performed utilizing techniques to minimize radiation dose exposure, including the use of iterative reconstruction and automated exposure control  IV Contrast:  100 mL of iohexol (OMNIPAQUE) Enteric Contrast:  Enteric contrast was not administered  FINDINGS: ABDOMEN LOWER CHEST:  There is elevation of the right hemidiaphragm with overlying atelectasis    There is left basilar atelectasis  LIVER/BILIARY TREE:  The liver demonstrates cirrhotic morphology  There is heterogeneity with diffuse hypoattenuating nodularity  No biliary dilatation  Portal vein is patent  GALLBLADDER: There is gallbladder distention measuring 13 cm  No calcified gallstones  No pericholecystic inflammatory change  SPLEEN:  Unremarkable  PANCREAS:  Unremarkable  ADRENAL GLANDS:  Unremarkable  KIDNEYS/URETERS:  No hydronephrosis or urinary tract calculus  One or more sharply circumscribed subcentimeter renal hypodensities are present, too small to accurately characterize, and statistically most likely benign findings  According to recent literature (Radiology 2019) no further workup of these findings is recommended  STOMACH AND BOWEL:  There is mild wall thickening of the small bowel and colon through the transverse colon, likely related to portal hypertension  APPENDIX:  No findings to suggest appendicitis  ABDOMINOPELVIC CAVITY:  There is moderate abdominopelvic ascites  No pneumoperitoneum  No lymphadenopathy  VESSELS:  Unremarkable for patient's age  PELVIS REPRODUCTIVE ORGANS:  Unremarkable for patient's age  URINARY BLADDER:  Unremarkable  ABDOMINAL WALL/INGUINAL REGIONS:  Unremarkable  OSSEOUS STRUCTURES:  No acute fracture or destructive osseous lesion  Impression: 1  Cirrhosis with moderate ascites  The liver is diffusely heterogeneous with multiple hypoattenuating nodular foci  Correlation with AFP is recommended  If there is clinical concern, a follow-up nonemergent MRI can be obtained to evaluate for discrete lesions  2   Mild small small bowel and right/transverse colonic wall thickening, likely related to portal hypertension  3   Distended gallbladder  No stones are visualized and evaluation of the gallbladder wall is limited by surrounding ascites  If there is clinical concern for acute cholecystitis, consider follow-up ultrasound   The study was marked in [facility] for immediate notification   Workstation performed: [id number]

## 2023-03-28 DIAGNOSIS — K74.60 DECOMPENSATED HEPATIC CIRRHOSIS (HCC): ICD-10-CM

## 2023-03-28 DIAGNOSIS — K72.90 DECOMPENSATED HEPATIC CIRRHOSIS (HCC): ICD-10-CM

## 2023-03-28 RX ORDER — PREDNISOLONE SODIUM PHOSPHATE 15 MG/5ML
40 SOLUTION ORAL DAILY
Qty: 93.1 ML | Refills: 0 | Status: SHIPPED | OUTPATIENT
Start: 2023-03-28 | End: 2023-04-04

## 2023-03-28 NOTE — TELEPHONE ENCOUNTER
Health call stating medication prednisone is a duplicate  Medication was ordered 5 days ago    Routing to pa

## 2023-04-05 DIAGNOSIS — F10.20 ALCOHOLISM (HCC): ICD-10-CM

## 2023-04-06 RX ORDER — LANOLIN ALCOHOL/MO/W.PET/CERES
100 CREAM (GRAM) TOPICAL DAILY
Qty: 30 TABLET | Refills: 5 | Status: SHIPPED | OUTPATIENT
Start: 2023-04-06 | End: 2023-05-06

## 2023-04-06 RX ORDER — FOLIC ACID 1 MG/1
1 TABLET ORAL DAILY
Qty: 30 TABLET | Refills: 5 | Status: SHIPPED | OUTPATIENT
Start: 2023-04-06 | End: 2023-05-06

## 2023-04-20 ENCOUNTER — APPOINTMENT (OUTPATIENT)
Dept: LAB | Facility: HOSPITAL | Age: 38
End: 2023-04-20

## 2023-04-20 DIAGNOSIS — K74.60 DECOMPENSATED HEPATIC CIRRHOSIS (HCC): Primary | ICD-10-CM

## 2023-04-20 DIAGNOSIS — K72.90 DECOMPENSATED HEPATIC CIRRHOSIS (HCC): Primary | ICD-10-CM

## 2023-04-20 LAB
ALBUMIN SERPL BCP-MCNC: 2.9 G/DL (ref 3.5–5)
ALP SERPL-CCNC: 101 U/L (ref 34–104)
ALT SERPL W P-5'-P-CCNC: 25 U/L (ref 7–52)
ANION GAP SERPL CALCULATED.3IONS-SCNC: 4 MMOL/L (ref 4–13)
AST SERPL W P-5'-P-CCNC: 40 U/L (ref 13–39)
BASOPHILS # BLD AUTO: 0.06 THOUSANDS/ΜL (ref 0–0.1)
BASOPHILS NFR BLD AUTO: 1 % (ref 0–1)
BILIRUB SERPL-MCNC: 1.85 MG/DL (ref 0.2–1)
BUN SERPL-MCNC: 8 MG/DL (ref 5–25)
CALCIUM ALBUM COR SERPL-MCNC: 9.7 MG/DL (ref 8.3–10.1)
CALCIUM SERPL-MCNC: 8.8 MG/DL (ref 8.4–10.2)
CHLORIDE SERPL-SCNC: 107 MMOL/L (ref 96–108)
CO2 SERPL-SCNC: 27 MMOL/L (ref 21–32)
CREAT SERPL-MCNC: 0.61 MG/DL (ref 0.6–1.3)
EOSINOPHIL # BLD AUTO: 0.07 THOUSAND/ΜL (ref 0–0.61)
EOSINOPHIL NFR BLD AUTO: 1 % (ref 0–6)
ERYTHROCYTE [DISTWIDTH] IN BLOOD BY AUTOMATED COUNT: 13.4 % (ref 11.6–15.1)
GFR SERPL CREATININE-BSD FRML MDRD: 126 ML/MIN/1.73SQ M
GLUCOSE SERPL-MCNC: 147 MG/DL (ref 65–140)
HCT VFR BLD AUTO: 42 % (ref 36.5–49.3)
HGB BLD-MCNC: 14.4 G/DL (ref 12–17)
IMM GRANULOCYTES # BLD AUTO: 0.01 THOUSAND/UL (ref 0–0.2)
IMM GRANULOCYTES NFR BLD AUTO: 0 % (ref 0–2)
INR PPP: 1.36 (ref 0.84–1.19)
LYMPHOCYTES # BLD AUTO: 1.9 THOUSANDS/ΜL (ref 0.6–4.47)
LYMPHOCYTES NFR BLD AUTO: 34 % (ref 14–44)
MCH RBC QN AUTO: 33.3 PG (ref 26.8–34.3)
MCHC RBC AUTO-ENTMCNC: 34.3 G/DL (ref 31.4–37.4)
MCV RBC AUTO: 97 FL (ref 82–98)
MONOCYTES # BLD AUTO: 0.77 THOUSAND/ΜL (ref 0.17–1.22)
MONOCYTES NFR BLD AUTO: 14 % (ref 4–12)
NEUTROPHILS # BLD AUTO: 2.83 THOUSANDS/ΜL (ref 1.85–7.62)
NEUTS SEG NFR BLD AUTO: 50 % (ref 43–75)
NRBC BLD AUTO-RTO: 0 /100 WBCS
PLATELET # BLD AUTO: 184 THOUSANDS/UL (ref 149–390)
PMV BLD AUTO: 10.9 FL (ref 8.9–12.7)
POTASSIUM SERPL-SCNC: 3.5 MMOL/L (ref 3.5–5.3)
PROT SERPL-MCNC: 7 G/DL (ref 6.4–8.4)
PROTHROMBIN TIME: 16.5 SECONDS (ref 11.6–14.5)
RBC # BLD AUTO: 4.32 MILLION/UL (ref 3.88–5.62)
SODIUM SERPL-SCNC: 138 MMOL/L (ref 135–147)
WBC # BLD AUTO: 5.64 THOUSAND/UL (ref 4.31–10.16)

## 2023-04-21 LAB — CERULOPLASMIN SERPL-MCNC: 24.8 MG/DL (ref 16–31)

## 2023-05-07 NOTE — PROGRESS NOTES
[facility] - Outpatient Consultation  Salena Donovan 45 y o  male MRN: [id number]  Encounter: [id number]    PCP:  Estela Valente MD, [phone]  Referring Provider:  No ref  provider found,     Patient: Salena Donovan, 1985  Reason for Referral: EtOH hepatitis     ASSESSMENT/PLAN:  45 y o  male with history of HLD, HTN, CVA and EtOH use disorder c/b ALD who presents for follow up after recent hospitalization for severe EtOH hepatitis  He has no acute liver specific complaints or clinical evidence of hepatic decompensation  He was admitted to [address] [place] [address] in early March for cholestatic liver injury with AST predominance and liver synthetic dysfunction on outpatient labs for work-up of abdominal pain  He reported history of heavy EtOH consumption (two 40 oz daily) and was noted to have severe EtOH hepatitis with MELD-Na 26 and DF 38  He had an MRI abdomen which showed cirrhotic liver morphology with intraabdominal varices and large amount of ascites  He had an LVP which confirmed portal hypertensive ascites that was negative for SBP  He had an EGD which showed grade 2 esophageal varices and was started on nadolol  He was started on prednisolone and completed 28d course with improvement of his liver chemistries and updated MELD-Na 13  We discussed natural history, prognosis, and complications of cirrhosis, including decompensation in the form of ascites, variceal bleeding, and hepatic encephalopathy as well as risk for development of HCC  We discussed that complete EtOH abstinence and nutritional support is key in promoting hepatic recovery although he clinically has recompensated and remains on minimal therapy  He is otherwise up-to-date with routine cirrhosis health maintenance  I would recommend an elastography in 6 months to determine if he still has advanced fibrosis with prolonged abstinence   I advised that he should avoid interval weight gain, excessive EtOH consumption and hepatotoxic medications  He will return to clinic with pre-clinic labs ordered  Thank you for the opportunity to consult in his care  1  Decompensated cirrhosis: MELD-Na 13, Child-Echeverria A6  - Etiology: EtOH in early remission  - Repeat MELD labs in 3 months   - US elastography in 6 months     2  Ascites  - Would start diuretics if he develops recurrent ascites needing drainage   - He will continue a low sodium diet (< 2g per day) to help prevent fluid retention     3  Varices  - Last EGD showed grade 2 varices  - On NSBB for primary prophylaxis but not currently at goal for HR  Recommend switching to carvedilol   - No further EGDs needed for screening     4  [facility] [address]   - No lesions seen on MRI abdomen 3/2023  - Repeat US in 9/2023 with AFP levels     5  Nutrition and sarcopenia  - He was instructed to eat multiple small meals a day and a snack prior to bedtime to help prevent protein loss and sarcopenia    6  Healthcare maintenance for patients with cirrhosis  -He was instructed to take no more than 2 grams of tylenol in 24 hours and no products containing NSAIDs, benzodiazapines, and narcotics  -He was also instructed to avoid raw shellfish   -He should participate in daily exercise as to prevent loss of muscle mass  -He should abstain from all alcohol intake and was counseled on this     -He is at risk for vitamin deficiencies and metabolic bone disease  -HAV and HBV immunity will be checked and he should be vaccinated through his primary care provider if he is not immune   -Additionally, he should receive a yearly flu shot and the pneumonia vaccine through his primary care provider      Bao Hope MD  Division of Gastroenterology and Hepatology  [address] [place] [address],[facility] Floor    ============================================================================  CC/HPI: 45 y o  male with history of HLD, HTN, CVA and EtOH use disorder c/b ALD who presents for follow up after recent hospitalization for severe EtOH hepatitis  He was admitted to [facility]) in early March for abnormal liver chemistries on outpatient labs for work-up of abdominal pain  He reported history of heavy EtOH consumption (two 40 oz daily x 10 years) and was noted to have severe EtOH hepatitis with MELD-Na 26 and DF 38  He had an MRI abdomen which showed cirrhotic liver morphology with intraabdominal varices and large amount of ascites  He was started on prednisolone and completed 28d course with improvement of his liver chemistries and updated MELD-Na 13  He has required intermittent LVPs with fluid studies consistent with portal hypertension and negative for SBP  He was not started on diuretics but did last require and LVP in April 2023 with removal of 4 2L  He had an EGD which showed grade 2 esophageal varices and was started on nadolol  Since his diagnosis, he has been abstinent from EtOH  He states that he did not know of his liver disease prior to his hospitalization  He denies a prior history of DUI or work-related issues due to EtOH  He denies recreational drug use  He lives with his wife and has no children  He states that there is no EtOH in his home  He is feeling well and reports that his pain has resolved  ROS: Complete review of systems otherwise negative       PAST MEDICAL/SURGICAL HISTORY:  Past Medical History:   Diagnosis Date   • Hyperlipidemia    • Hypertension    • Stroke [facility])         Past Surgical History:   Procedure Laterality Date   • IR PARACENTESIS  3/3/2023   • IR PARACENTESIS  3/6/2023   • IR PARACENTESIS  3/17/2023   • IR PARACENTESIS  4/10/2023       FAMILY/SOCIAL HISTORY:  Family History   Problem Relation Age of Onset   • Lymphoma Mother    • Heart attack Mother    • Hypertension Mother    • Hypertension Father        Social History     Tobacco Use   • Smoking status: Never     Passive exposure: Never   • Smokeless tobacco: Current   • Tobacco comments:     Patient states he is ready for tobacco cessation   Vaping "Use   • Vaping Use: Never used   Substance Use Topics   • Alcohol use: Not Currently     Alcohol/week: 6 0 standard drinks     Types: 6 Cans of beer per week   • Drug use: Never       MEDICATIONS:  Current Outpatient Medications on File Prior to Visit   Medication Sig Dispense Refill   • atorvastatin (LIPITOR) 40 mg tablet Take 1 tablet (40 mg total) by mouth daily 90 tablet 1   • escitalopram (LEXAPRO) 10 mg tablet Take 1 tablet (10 mg total) by mouth daily 90 tablet 3   • folic acid (FOLVITE) 1 mg tablet Take 1 tablet (1 mg total) by mouth daily 30 tablet 5   • nadolol (CORGARD) 20 mg tablet Take 1 tablet (20 mg total) by mouth daily 90 tablet 3   • thiamine 100 MG tablet Take 1 tablet (100 mg total) by mouth daily 30 tablet 5   • traZODone (DESYREL) 50 mg tablet Take 1 tablet (50 mg total) by mouth daily at bedtime 90 tablet 3     No current facility-administered medications on file prior to visit       No Known Allergies    PHYSICAL EXAM:  /70 (BP Location: Left arm, Patient Position: Sitting, Cuff Size: Standard)   Pulse 75   Ht 6' 1\" (1 854 m)   Wt 93 9 kg (207 lb)   SpO2 98%   BMI 27 31 kg/m²   GENERAL: NAD, AAO  HEENT: anicteric, OP clear, MMM  ABDOMEN: S/ND/NT, normoactive BS, no hepatomegaly, spleen not palpable  EXTREMITIES: no edema  SKIN: no rashes, no palmar erythema, no spider angiomata   NEURO: normal gait, no tremor, no asterixis     LABS/RADIOLOGY/ENDOSCOPY:  Lab Results   Component Value Date    WBC 5 64 04/20/2023    HGB 14 4 04/20/2023    HCT 42 0 04/20/2023     04/20/2023    GLUF 76 03/08/2023    BUN 8 04/20/2023    CREATININE 0 61 04/20/2023    K 3 5 04/20/2023     04/20/2023    CO2 27 04/20/2023    ALKPHOS 101 04/20/2023    ALT 25 04/20/2023    AST 40 (H) 04/20/2023    CALCIUM 8 8 04/20/2023    EGFR 126 04/20/2023    TRIG 72 02/24/2023    HDL 20 (L) 02/24/2023    INR 1 36 (H) 04/20/2023    PTT 38 (H) 03/03/2023     MRI abdomen with MRCP (3/4/2023)  Hepatic cirrhosis " with portal hypertension as evidenced by moderate to large volume of ascites, and small varices  No MR findings to indicate hepatocellular carcinoma        EGD (3/6/2023)  • Four or more medium and moderate grade II varices (no red demetrius sign) in the lower third of the esophagus; no bleeding was identified  • There is evidence of a moderate to severe portal gastropathy involving the cardia, fundus, body of the stomach  • There is a moderate amount of food debris retained in the stomach consistent with gastroparesis    • The duodenal bulb and 2nd part of the duodenum appeared normal     MELD-Na score: 13 at 4/20/2023  3:25 PM  MELD score: 13 at 4/20/2023  3:25 PM  Calculated from:  Serum Creatinine: 0 61 mg/dL (Using min of 1 mg/dL) at 4/20/2023  3:25 PM  Serum Sodium: 138 mmol/L (Using max of 137 mmol/L) at 4/20/2023  3:25 PM  Total Bilirubin: 1 85 mg/dL at 4/20/2023  3:25 PM  INR(ratio): 1 36 at 4/20/2023  3:25 PM  Age: 45 years

## 2023-05-08 ENCOUNTER — OFFICE VISIT (OUTPATIENT)
Dept: GASTROENTEROLOGY | Facility: CLINIC | Age: 38
End: 2023-05-08

## 2023-05-08 VITALS
HEIGHT: 73 IN | BODY MASS INDEX: 27.43 KG/M2 | OXYGEN SATURATION: 98 % | WEIGHT: 207 LBS | SYSTOLIC BLOOD PRESSURE: 118 MMHG | HEART RATE: 75 BPM | DIASTOLIC BLOOD PRESSURE: 70 MMHG

## 2023-05-08 DIAGNOSIS — I85.00 ESOPHAGEAL VARICES WITHOUT BLEEDING, UNSPECIFIED ESOPHAGEAL VARICES TYPE (HCC): ICD-10-CM

## 2023-05-08 DIAGNOSIS — K74.60 DECOMPENSATED HEPATIC CIRRHOSIS (HCC): Primary | ICD-10-CM

## 2023-05-08 DIAGNOSIS — K70.11 ALCOHOLIC HEPATITIS WITH ASCITES: ICD-10-CM

## 2023-05-08 DIAGNOSIS — K72.90 DECOMPENSATED HEPATIC CIRRHOSIS (HCC): Primary | ICD-10-CM

## 2023-05-08 RX ORDER — CARVEDILOL 3.12 MG/1
6.25 TABLET ORAL 2 TIMES DAILY WITH MEALS
Qty: 360 TABLET | Refills: 3 | Status: SHIPPED | OUTPATIENT
Start: 2023-05-08 | End: 2024-05-07

## 2023-05-08 NOTE — PATIENT INSTRUCTIONS
As discussed in the office, you have a diagnosis of cirrhosis  The main complications of cirrhosis are:   1  Ascites (fluid in the belly)  2  Hepatic Encephalopathy (confusion)  3  Varices (veins in the esophagus that can bleed)  4  Hepatocellular Carcinoma (liver cancer)    Should you develop any new symptoms or have worsening symptoms please contact our office immediately  You should go to the nearest emergency room and call our office immediately if any of the following occur:  1  Temperature of 101 or above  2  Confusion  3  Vomiting blood  4  Multiple black or red stools  5  Shortness of breath  6  Severe diarrhea  7  Vomiting for more than twice in 24 hours     General Instructions:  - You will need a picture of your liver with an ultrasound or CTMRI every 6 months to screen for liver cancer  - Take no more than 2 grams of tylenol in 24 hours  - Do not use any products containing NSAIDs, benzodiazapines, and narcotics  - Avoid all avoid raw shellfish   - Try and participate in daily exercise as to prevent loss of muscle mass  - Abstain from all alcohol intake  - You should ask your primary care to ensure you have been vaccinated for pneumonia and the flu  - Call my office to discuss the risks and benefits of any new medications (prescribed or over the counter) before taking the new medication   - Call my office to discuss any surgeries (elective or emergent) since cirrhotic patients are at an increased risk for surgery in terms of infection, bleeding, and even death    - Call my office if you are ever seen in the ER or admitted to the hospital

## 2023-06-16 ENCOUNTER — OFFICE VISIT (OUTPATIENT)
Dept: INTERNAL MEDICINE CLINIC | Facility: CLINIC | Age: 38
End: 2023-06-16
Payer: COMMERCIAL

## 2023-06-16 VITALS
DIASTOLIC BLOOD PRESSURE: 68 MMHG | OXYGEN SATURATION: 96 % | HEART RATE: 71 BPM | BODY MASS INDEX: 28.23 KG/M2 | RESPIRATION RATE: 16 BRPM | WEIGHT: 213 LBS | SYSTOLIC BLOOD PRESSURE: 118 MMHG | HEIGHT: 73 IN

## 2023-06-16 DIAGNOSIS — I10 PRIMARY HYPERTENSION: ICD-10-CM

## 2023-06-16 DIAGNOSIS — F10.20 ALCOHOLISM (HCC): Primary | ICD-10-CM

## 2023-06-16 DIAGNOSIS — E78.2 MIXED HYPERLIPIDEMIA: ICD-10-CM

## 2023-06-16 PROBLEM — K70.11 ALCOHOLIC HEPATITIS WITH ASCITES: Status: RESOLVED | Noted: 2023-03-06 | Resolved: 2023-06-16

## 2023-06-16 PROBLEM — K72.90 DECOMPENSATED HEPATIC CIRRHOSIS (HCC): Status: RESOLVED | Noted: 2023-03-03 | Resolved: 2023-06-16

## 2023-06-16 PROBLEM — K74.60 DECOMPENSATED HEPATIC CIRRHOSIS (HCC): Status: RESOLVED | Noted: 2023-03-03 | Resolved: 2023-06-16

## 2023-06-16 PROCEDURE — 99214 OFFICE O/P EST MOD 30 MIN: CPT | Performed by: INTERNAL MEDICINE

## 2023-06-16 NOTE — PROGRESS NOTES
Assessment/Plan:     Clovis Blanton looks great! No longer drinking alcohol! He has come a long way; he saw hepatologist last month; he is determined to stay focused on his recovery; we will continue current management; labs in 6 months; continue statin, coreg, lexapro for mood; he is starting to help with his father in [place] [address]; he is working out; no significant complaints noted  Quality Measures:     BMI Counseling: There is no height or weight on file to calculate BMI  The BMI is above normal  Nutrition recommendations include decreasing portion sizes and encouraging healthy choices of fruits and vegetables  Exercise recommendations include moderate physical activity 150 minutes/week  Rationale for BMI follow-up plan is due to patient being overweight or obese  Return in about 6 months (around 12/16/2023)  No problem-specific Assessment & Plan notes found for this encounter  Diagnoses and all orders for this visit:    Alcoholism ([facility] [address] )  -     CBC and differential; Future  -     Comprehensive metabolic panel; Future  -     CBC and differential  -     Comprehensive metabolic panel    Primary hypertension  -     CBC and differential; Future  -     TSH, 3rd generation with Free T4 reflex; Future  -     Comprehensive metabolic panel; Future  -     CBC and differential  -     TSH, 3rd generation with Free T4 reflex  -     Comprehensive metabolic panel    Mixed hyperlipidemia  -     Hemoglobin A1C; Future  -     Lipid Panel with Direct LDL reflex; Future  -     Lipid Panel with Direct LDL reflex          Subjective:      Patient ID: Gokul Gutierrez is a 45 y o  male  Here for routine f/u        ALLERGIES:  No Known Allergies    CURRENT MEDICATIONS:    Current Outpatient Medications:   •  atorvastatin (LIPITOR) 40 mg tablet, Take 1 tablet (40 mg total) by mouth daily, Disp: 90 tablet, Rfl: 1  •  carvedilol (COREG) 3 125 mg tablet, Take 2 tablets (6 25 mg total) by mouth 2 (two) times a day with meals, Disp: 360 tablet, Rfl: 3  •  escitalopram (LEXAPRO) 10 mg tablet, Take 1 tablet (10 mg total) by mouth daily, Disp: 90 tablet, Rfl: 3  •  folic acid (FOLVITE) 1 mg tablet, Take 1 tablet (1 mg total) by mouth daily, Disp: 30 tablet, Rfl: 5  •  thiamine 100 MG tablet, Take 1 tablet (100 mg total) by mouth daily, Disp: 30 tablet, Rfl: 5  •  traZODone (DESYREL) 50 mg tablet, Take 1 tablet (50 mg total) by mouth daily at bedtime, Disp: 90 tablet, Rfl: 3    ACTIVE PROBLEM LIST:  Patient Active Problem List   Diagnosis   • Mixed hyperlipidemia   • Depression   • Primary hypertension   • Alcoholism ([facility] [address] )       PAST MEDICAL HISTORY:  Past Medical History:   Diagnosis Date   • Hyperlipidemia    • Hypertension    • Stroke [facility])        PAST SURGICAL HISTORY:  Past Surgical History:   Procedure Laterality Date   • IR PARACENTESIS  3/3/2023   • IR PARACENTESIS  3/6/2023   • IR PARACENTESIS  3/17/2023   • IR PARACENTESIS  4/10/2023       FAMILY HISTORY:  Family History   Problem Relation Age of Onset   • Lymphoma Mother    • Heart attack Mother    • Hypertension Mother    • Hypertension Father        SOCIAL HISTORY:  Social History     Socioeconomic History   • Marital status: Single     Spouse name: Not on file   • Number of children: Not on file   • Years of education: Not on file   • Highest education level: Not on file   Occupational History   • Not on file   Tobacco Use   • Smoking status: Never     Passive exposure: Never   • Smokeless tobacco: Former   • Tobacco comments:     Patient states he is ready for tobacco cessation   Vaping Use   • Vaping Use: Never used   Substance and Sexual Activity   • Alcohol use: Not Currently     Alcohol/week: 6 0 standard drinks of alcohol     Types: 6 Cans of beer per week   • Drug use: Never   • Sexual activity: Not Currently   Other Topics Concern   • Not on file   Social History Narrative   • Not on file     Social Determinants of Health     Financial Resource Strain: Not on file   Food "Insecurity: Not on file   Transportation Needs: Not on file   Physical Activity: Not on file   Stress: Not on file   Social Connections: Not on file   Intimate Partner Violence: Not on file   Housing Stability: Not on file       Review of Systems   Constitutional: Negative for chills and fever  HENT: Negative for ear pain and sore throat  Eyes: Negative for pain and visual disturbance  Respiratory: Negative for cough and shortness of breath  Cardiovascular: Negative for chest pain and palpitations  Gastrointestinal: Negative for abdominal pain and vomiting  Genitourinary: Negative for dysuria and hematuria  Musculoskeletal: Negative for arthralgias and back pain  Skin: Negative for color change and rash  Neurological: Negative for seizures and syncope  All other systems reviewed and are negative  Objective:  Vitals:    06/16/23 1336   BP: 118/68   BP Location: Left arm   Patient Position: Sitting   Pulse: 71   Resp: 16   SpO2: 96%   Weight: 96 6 kg (213 lb)   Height: 6' 1\" (1 854 m)     Body mass index is 28 1 kg/m²  Physical Exam  Vitals and nursing note reviewed  Constitutional:       Appearance: Normal appearance  HENT:      Head: Normocephalic and atraumatic  Cardiovascular:      Rate and Rhythm: Normal rate and regular rhythm  Heart sounds: Normal heart sounds  Pulmonary:      Effort: Pulmonary effort is normal       Breath sounds: Normal breath sounds  Musculoskeletal:         General: Normal range of motion  Cervical back: Normal range of motion  Skin:     General: Skin is warm and dry  Neurological:      General: No focal deficit present  Mental Status: He is alert and oriented to person, place, and time  Mental status is at baseline  Psychiatric:         Mood and Affect: Mood normal            RESULTS:    In chart    This note was created with voice recognition software    Phonic, grammatical and spelling errors may be present within the note as " a result

## 2023-07-11 ENCOUNTER — VBI (OUTPATIENT)
Dept: ADMINISTRATIVE | Facility: OTHER | Age: 38
End: 2023-07-11

## 2023-08-23 ENCOUNTER — LAB (OUTPATIENT)
Dept: LAB | Facility: HOSPITAL | Age: 38
End: 2023-08-23
Attending: STUDENT IN AN ORGANIZED HEALTH CARE EDUCATION/TRAINING PROGRAM
Payer: COMMERCIAL

## 2023-08-23 DIAGNOSIS — K74.60 DECOMPENSATED HEPATIC CIRRHOSIS (HCC): ICD-10-CM

## 2023-08-23 DIAGNOSIS — K70.11 ALCOHOLIC HEPATITIS WITH ASCITES: ICD-10-CM

## 2023-08-23 DIAGNOSIS — K72.90 DECOMPENSATED HEPATIC CIRRHOSIS (HCC): ICD-10-CM

## 2023-08-23 LAB
ALBUMIN SERPL BCP-MCNC: 4.7 G/DL (ref 3.5–5)
ALP SERPL-CCNC: 85 U/L (ref 34–104)
ALT SERPL W P-5'-P-CCNC: 46 U/L (ref 7–52)
ANION GAP SERPL CALCULATED.3IONS-SCNC: 6 MMOL/L
AST SERPL W P-5'-P-CCNC: 46 U/L (ref 13–39)
BASOPHILS # BLD AUTO: 0.05 THOUSANDS/ÂΜL (ref 0–0.1)
BASOPHILS NFR BLD AUTO: 1 % (ref 0–1)
BILIRUB SERPL-MCNC: 1.19 MG/DL (ref 0.2–1)
BUN SERPL-MCNC: 15 MG/DL (ref 5–25)
CALCIUM SERPL-MCNC: 10.2 MG/DL (ref 8.4–10.2)
CHLORIDE SERPL-SCNC: 102 MMOL/L (ref 96–108)
CO2 SERPL-SCNC: 29 MMOL/L (ref 21–32)
CREAT SERPL-MCNC: 1.07 MG/DL (ref 0.6–1.3)
EOSINOPHIL # BLD AUTO: 0.08 THOUSAND/ÂΜL (ref 0–0.61)
EOSINOPHIL NFR BLD AUTO: 2 % (ref 0–6)
ERYTHROCYTE [DISTWIDTH] IN BLOOD BY AUTOMATED COUNT: 12.7 % (ref 11.6–15.1)
GFR SERPL CREATININE-BSD FRML MDRD: 87 ML/MIN/1.73SQ M
GLUCOSE P FAST SERPL-MCNC: 102 MG/DL (ref 65–99)
HAV AB SER QL IA: REACTIVE
HBV SURFACE AB SER-ACNC: <3 MIU/ML
HCT VFR BLD AUTO: 45.4 % (ref 36.5–49.3)
HGB BLD-MCNC: 16 G/DL (ref 12–17)
IMM GRANULOCYTES # BLD AUTO: 0 THOUSAND/UL (ref 0–0.2)
IMM GRANULOCYTES NFR BLD AUTO: 0 % (ref 0–2)
INR PPP: 1.12 (ref 0.84–1.19)
LYMPHOCYTES # BLD AUTO: 2.03 THOUSANDS/ÂΜL (ref 0.6–4.47)
LYMPHOCYTES NFR BLD AUTO: 47 % (ref 14–44)
MCH RBC QN AUTO: 31.1 PG (ref 26.8–34.3)
MCHC RBC AUTO-ENTMCNC: 35.2 G/DL (ref 31.4–37.4)
MCV RBC AUTO: 88 FL (ref 82–98)
MONOCYTES # BLD AUTO: 0.41 THOUSAND/ÂΜL (ref 0.17–1.22)
MONOCYTES NFR BLD AUTO: 9 % (ref 4–12)
NEUTROPHILS # BLD AUTO: 1.78 THOUSANDS/ÂΜL (ref 1.85–7.62)
NEUTS SEG NFR BLD AUTO: 41 % (ref 43–75)
NRBC BLD AUTO-RTO: 0 /100 WBCS
PLATELET # BLD AUTO: 129 THOUSANDS/UL (ref 149–390)
PMV BLD AUTO: 11.2 FL (ref 8.9–12.7)
POTASSIUM SERPL-SCNC: 4.3 MMOL/L (ref 3.5–5.3)
PROT SERPL-MCNC: 8.2 G/DL (ref 6.4–8.4)
PROTHROMBIN TIME: 15.1 SECONDS (ref 11.6–14.5)
RBC # BLD AUTO: 5.14 MILLION/UL (ref 3.88–5.62)
SODIUM SERPL-SCNC: 137 MMOL/L (ref 135–147)
WBC # BLD AUTO: 4.35 THOUSAND/UL (ref 4.31–10.16)

## 2023-08-23 PROCEDURE — 80053 COMPREHEN METABOLIC PANEL: CPT

## 2023-08-23 PROCEDURE — 36415 COLL VENOUS BLD VENIPUNCTURE: CPT

## 2023-08-23 PROCEDURE — 85610 PROTHROMBIN TIME: CPT

## 2023-08-23 PROCEDURE — 86708 HEPATITIS A ANTIBODY: CPT

## 2023-08-23 PROCEDURE — 86706 HEP B SURFACE ANTIBODY: CPT

## 2023-08-23 PROCEDURE — 85025 COMPLETE CBC W/AUTO DIFF WBC: CPT

## 2023-09-05 ENCOUNTER — OFFICE VISIT (OUTPATIENT)
Dept: GASTROENTEROLOGY | Facility: CLINIC | Age: 38
End: 2023-09-05
Payer: COMMERCIAL

## 2023-09-05 VITALS
HEIGHT: 73 IN | HEART RATE: 73 BPM | SYSTOLIC BLOOD PRESSURE: 102 MMHG | WEIGHT: 218.4 LBS | DIASTOLIC BLOOD PRESSURE: 80 MMHG | OXYGEN SATURATION: 98 % | BODY MASS INDEX: 28.94 KG/M2

## 2023-09-05 DIAGNOSIS — I85.00 ESOPHAGEAL VARICES WITHOUT BLEEDING, UNSPECIFIED ESOPHAGEAL VARICES TYPE (HCC): ICD-10-CM

## 2023-09-05 DIAGNOSIS — R42 DIZZINESS: ICD-10-CM

## 2023-09-05 DIAGNOSIS — K72.90 DECOMPENSATED HEPATIC CIRRHOSIS (HCC): Primary | ICD-10-CM

## 2023-09-05 DIAGNOSIS — K74.60 DECOMPENSATED HEPATIC CIRRHOSIS (HCC): Primary | ICD-10-CM

## 2023-09-05 DIAGNOSIS — R04.0 EPISTAXIS: ICD-10-CM

## 2023-09-05 DIAGNOSIS — K70.11 ALCOHOLIC HEPATITIS WITH ASCITES: ICD-10-CM

## 2023-09-05 PROCEDURE — 99214 OFFICE O/P EST MOD 30 MIN: CPT | Performed by: FAMILY MEDICINE

## 2023-09-05 NOTE — PATIENT INSTRUCTIONS
Below is a summary of our recommendations from today's visit. Please schedule your ultrasound in September 2023 to screen for liver cancer. Please schedule your ultrasound elastography in November 2023 to reevaluate your liver stiffness/scarring. Please have your labs drawn approximately 2 weeks prior to your follow-up appointment. Please call central scheduling to schedule your lab draw for the hemochromatosis mutation (insurance permitting). Please let us know if your nosebleeds or dizziness worsen. Follow-up in 3 months. Please review this general information in regards to cirrhosis, as discussed during your office visit:  The four main complications of cirrhosis that you may develop include a buildup of fluid in the abdomen, dilated blood vessels in the esophagus that have the potential to bleed, confusion, and an increased risk of developing liver cancer - Please contact our office immediately for any new or worsening symptoms. Seek immediate medical attention for symptoms including, but not limited to, fevers over 101° F, new or worsening confusion, yellowing of the eyes or skin, or evidence of GI bleeding such as vomiting red blood or coffee-ground appearing material and/or rectal bleeding to include red blood or black/tarry appearing stools. Avoid alcohol use and tobacco containing products entirely. Avoid consuming raw seafood or shellfish and swimming in unchlorinated [place] due to increased risk of certain infections. Avoid all NSAID use (ibuprofen, Advil, Aleve, Motrin, etc). Do not exceed more than 2 g of acetaminophen (Tylenol) within a 24 hour period. Remain up-to-date on all age-appropriate vaccinations, as well as, annual influenza and COVID-19 booster vaccines.    Please call our offices and inform provider if you were recently seen in the emergency department or admitted to the hospital, prior to starting new medications (prescription or over-the-counter), and prior to any surgical intervention. Please feel free to contact our offices at any time with your questions or concerns. Thank you for allowing me to participate in your care today.

## 2023-09-05 NOTE — PROGRESS NOTES
[facility] - Outpatient Follow-up  Randa Caballero 45 y.o. male MRN: [id number]  Encounter: [id number]          ASSESSMENT AND PLAN:      1. Decompensated hepatic cirrhosis ([address])  2. Esophageal varices without bleeding, unspecified esophageal varices type ([address])  3. Alcoholic hepatitis with ascites  4. Dizziness  5. Epistaxis    Summary: Patient is a 45 y.o. male with cirrhosis secondary to EtOH d/b ascites and nonbleeding esophageal varices. Current MELD-Na (9). Patient was diagnosed with cirrhosis via imaging, notable for cirrhotic morphology and evidence of portal hypertension, during a hospitalization in March 2023 for abnormal outpatient serologies found to have severe alcoholic hepatitis (MELD-Na 26, MDF 38) treated with prednisolone x28 days with improvement. Today, he continues to do well with maintained sobriety since his hospitalization. Denies reoccurring ascites. He is up-to-date with his EGD for variceal screening and currently taking carvedilol 6.25 mg twice daily for primary ppx. He does report some occasional dizziness, typically with changing positions, and discussed this is likely secondary to orthostatic hypotension from carvedilol use. BP low-normal in office today and would not recommend discontinuing BB unless his sxs's were to become more frequent or worsen. Encouraged him to stay well-hydrated and change positions slowly to help prevent dizziness. Also reports occasional and self-limited nosebleeds, and discussed this is likely due to thrombocytopenia. Recommended use of nasal saline sprays and humidifiers to keep his nasal passages moist. If symptoms worsen, would recommend referral to ENT. He is also up-to-date with his imaging for hepatoma screening. He has been ordered for an abdominal U/S to be scheduled in 9/2023 for hepatoma screening and an U/S elastography to be scheduled in 11/2023 to restage his fibrosis with prolonged sobriety. Will assist patient with scheduling today. Lastly, he will discuss completing the vaccination series for hep B through his PCP and will have MELD labs drawn 2 weeks prior to his follow-up. Ascites   - Hx of ascites requiring LVP (last 4/2023). - No clinical evidence of ascites and patient will continue to monitor for signs/sxs's of volume-overload; Plan to start diuretics if this were to occur.   - Encouraged to adhere to a low-sodium (<2000 mg daily) diet. Esophageal Varices   - EGD (3/2023) notable for grade 2 esophageal varices and moderate PHG. - Tolerating carvedilol 6.25 mg twice daily for primary ppx.   - No further EGDs required for variceal screening. Hepatic Encephalopathy   - No hx or evidence of HE on exam today. - Patient aware of signs/sxs's of HE and advised to promptly inform provider if experiencing such. [address] Screening   - MRI (3/2023) without focal liver lesion and AFP normal.  - Ordered for an U/S w/ Doppler due in 9/2023.   - Continue imaging q6 months and AFP annually. Nutritional Status  - Encouraged high-protein diet in addition to a high-protein snack qHS to prevent prolonged fasting. Vaccines   - Patient with immunity to hep A but not hep B.   - He will look into vaccine with his PCP.     - CBC and Platelet; Future  - Comprehensive metabolic panel; Future  - Protime-INR; Future    Follow-up in 3 months with Dr. Butterfield Friends.   ______________________________________________________________________    HPI: Patient is a 45 y.o. male with PMH significant for HLD, HTN, CVA and history of alcohol dependence in remission who presents today for a follow-up regarding cirrhosis secondary to EtOH. Interval history  - Recent serologies (8/23) with improved MELD score and also notable for immunity to hepatitis A but not hepatitis B.  States he is planning to discuss receiving the hepatitis B vaccine with his PCP.  - Reports occasional dizziness, typically with changing position, and self-limiting nosebleeds. BP low-normal today. - Reports maintained sobriety and denies any liver specific complaints. Extended liver history as per Dr. Alvarez Friends  Patient was admitted to [facility] in early March 2023 for abnormal liver chemistries on outpatient labs for work-up of abdominal pain. He reported history of heavy EtOH consumption (two 40oz daily x 10 years) and was noted to have severe EtOH hepatitis with MELD-Na 26 and MDF 38. He had an MRI abdomen notable for cirrhotic liver morphology with intraabdominal varices and large amount of ascites. He was started on prednisolone and completed 28d course with improvement of his liver chemistries and updated MELD-Na 13. He has required intermittent LVPs with fluid studies consistent with portal hypertension and negative for SBP. He was not started on diuretics and last require and LVP in April 2023 with removal of 4.2L. He had an EGD which showed grade 2 esophageal varices and was started on nadolol.      Since his diagnosis, he has been abstinent from EtOH. He states that he did not know of his liver disease prior to his hospitalization. He denies a prior history of DUI or work-related issues due to EtOH. He denies recreational drug use. He lives with his wife and has no children and that there is no EtOH in his home. MELD 3.0: 9 at 8/23/2023  9:56 AM  MELD-Na: 9 at 8/23/2023  9:56 AM  Calculated from:  Serum Creatinine: 1.07 mg/dL at 8/23/2023  9:56 AM  Serum Sodium: 137 mmol/L at 8/23/2023  9:56 AM  Total Bilirubin: 1.19 mg/dL at 8/23/2023  9:56 AM  Serum Albumin: 4.7 g/dL (Using max of 3.5 g/dL) at 8/23/2023  9:56 AM  INR(ratio): 1.12 at 8/23/2023  9:56 AM  Age at listing (hypothetical): 38 years  Sex: Male at 8/23/2023  9:56 AM      REVIEW OF SYSTEMS:    CONSTITUTIONAL: Denies any fever, chills, rigors, and weight loss. HEENT: No earache or tinnitus. Denies hearing loss or visual disturbances. CARDIOVASCULAR: No chest pain or palpitations. RESPIRATORY: Denies any cough, hemoptysis, shortness of breath or dyspnea on exertion. GASTROINTESTINAL: As noted in the History of Present Illness. GENITOURINARY: No problems with urination. Denies any hematuria or dysuria. NEUROLOGIC: No dizziness or vertigo, denies headaches. MUSCULOSKELETAL: Denies any muscle or joint pain. SKIN: Denies skin rashes or itching. ENDOCRINE: Denies excessive thirst. Denies intolerance to heat or cold. PSYCHOSOCIAL: Denies depression or anxiety. Denies any recent memory loss. Historical Information   Past Medical History:   Diagnosis Date   • Hyperlipidemia    • Hypertension    • Stroke [facility])      Past Surgical History:   Procedure Laterality Date   • IR PARACENTESIS  3/3/2023   • IR PARACENTESIS  3/6/2023   • IR PARACENTESIS  3/17/2023   • IR PARACENTESIS  4/10/2023     Social History   Social History     Substance and Sexual Activity   Alcohol Use Not Currently   • Alcohol/week: 6.0 standard drinks of alcohol   • Types: 6 Cans of beer per week     Social History     Substance and Sexual Activity   Drug Use Never     Social History     Tobacco Use   Smoking Status Never   • Passive exposure: Never   Smokeless Tobacco Former   Tobacco Comments    Patient states he is ready for tobacco cessation     Family History   Problem Relation Age of Onset   • Lymphoma Mother    • Heart attack Mother    • Hypertension Mother    • Hypertension Father        Meds/Allergies       Current Outpatient Medications:   •  atorvastatin (LIPITOR) 40 mg tablet  •  carvedilol (COREG) 3.125 mg tablet  •  escitalopram (LEXAPRO) 10 mg tablet  •  folic acid (FOLVITE) 1 mg tablet  •  thiamine 100 MG tablet  •  traZODone (DESYREL) 50 mg tablet    No Known Allergies        Objective     Blood pressure 102/80, pulse 73, height 6' 1" (1.854 m), weight 99.1 kg (218 lb 6.4 oz), SpO2 98 %. Body mass index is 28.81 kg/m².         PHYSICAL EXAM:      General Appearance:   Alert, cooperative, no distress; AAOx3, no asterixis   HEENT:   Normocephalic, atraumatic, anicteric; No scleral icterus     Neck:  Supple, symmetrical, trachea midline   Lungs:   Clear to auscultation bilaterally; no rales, rhonchi or wheezing; respirations unlabored    Heart[de-identified]   Regular rate and rhythm; no murmur, rub, or gallop. Abdomen:   Soft, non-tender, non-distended; normal bowel sounds; no masses, no organomegaly    Genitalia:   Deferred    Rectal:   Deferred    Extremities:  No palmar erythema, cyanosis, clubbing or edema    Pulses:  2+ and symmetric    Skin:  No spider angiomas, jaundice, rashes, or lesions    Lymph nodes:  No palpable cervical lymphadenopathy        Lab Results:   No visits with results within 1 Day(s) from this visit.    Latest known visit with results is:   Lab on 08/23/2023   Component Date Value   • WBC 08/23/2023 4.35    • RBC 08/23/2023 5.14    • Hemoglobin 08/23/2023 16.0    • Hematocrit 08/23/2023 45.4    • MCV 08/23/2023 88    • MCH 08/23/2023 31.1    • MCHC 08/23/2023 35.2    • RDW 08/23/2023 12.7    • MPV 08/23/2023 11.2    • Platelets 16/79/4540 129 (L)    • nRBC 08/23/2023 0    • Neutrophils Relative 08/23/2023 41 (L)    • Immat GRANS % 08/23/2023 0    • Lymphocytes Relative 08/23/2023 47 (H)    • Monocytes Relative 08/23/2023 9    • Eosinophils Relative 08/23/2023 2    • Basophils Relative 08/23/2023 1    • Neutrophils Absolute 08/23/2023 1.78 (L)    • Immature Grans Absolute 08/23/2023 0.00    • Lymphocytes Absolute 08/23/2023 2.03    • Monocytes Absolute 08/23/2023 0.41    • Eosinophils Absolute 08/23/2023 0.08    • Basophils Absolute 08/23/2023 0.05    • Sodium 08/23/2023 137    • Potassium 08/23/2023 4.3    • Chloride 08/23/2023 102    • CO2 08/23/2023 29    • ANION GAP 08/23/2023 6    • BUN 08/23/2023 15    • Creatinine 08/23/2023 1.07    • Glucose, Fasting 08/23/2023 102 (H)    • Calcium 08/23/2023 10.2    • AST 08/23/2023 46 (H)    • ALT 08/23/2023 46    • Alkaline Phosphatase 08/23/2023 85    • Total Protein 08/23/2023 8.2    • Albumin 08/23/2023 4.7    • Total Bilirubin 08/23/2023 1.19 (H)    • eGFR 08/23/2023 87    • Protime 08/23/2023 15.1 (H)    • INR 08/23/2023 1.12    • Hep A Total Ab 08/23/2023 Reactive (A)    • Hep B S Ab 08/23/2023 <3.00          Radiology Results:   No results found. Sharon Stanley PA-C     **Please note: Dictation voice to text software may have been used in the creation of this record. Occasional wrong word or “sound alike” substitutions may have occurred due to the inherent limitations of voice recognition software. Read the chart carefully and recognize, using context, where substitutions have occurred. **

## 2023-10-03 DIAGNOSIS — F10.20 ALCOHOLISM (HCC): ICD-10-CM

## 2023-10-03 RX ORDER — FOLIC ACID 1 MG/1
1 TABLET ORAL DAILY
Qty: 30 TABLET | Refills: 5 | Status: SHIPPED | OUTPATIENT
Start: 2023-10-03

## 2023-10-03 RX ORDER — LANOLIN ALCOHOL/MO/W.PET/CERES
100 CREAM (GRAM) TOPICAL DAILY
Qty: 30 TABLET | Refills: 5 | Status: SHIPPED | OUTPATIENT
Start: 2023-10-03

## 2023-10-20 ENCOUNTER — HOSPITAL ENCOUNTER (OUTPATIENT)
Dept: ULTRASOUND IMAGING | Facility: HOSPITAL | Age: 38
End: 2023-10-20
Attending: STUDENT IN AN ORGANIZED HEALTH CARE EDUCATION/TRAINING PROGRAM
Payer: COMMERCIAL

## 2023-10-20 DIAGNOSIS — K74.60 DECOMPENSATED HEPATIC CIRRHOSIS (HCC): ICD-10-CM

## 2023-10-20 DIAGNOSIS — K72.90 DECOMPENSATED HEPATIC CIRRHOSIS (HCC): ICD-10-CM

## 2023-10-20 DIAGNOSIS — K70.11 ALCOHOLIC HEPATITIS WITH ASCITES: ICD-10-CM

## 2023-10-20 PROCEDURE — 76700 US EXAM ABDOM COMPLETE: CPT

## 2023-11-07 DIAGNOSIS — E78.2 MIXED HYPERLIPIDEMIA: ICD-10-CM

## 2023-11-08 RX ORDER — ATORVASTATIN CALCIUM 40 MG/1
40 TABLET, FILM COATED ORAL DAILY
Qty: 90 TABLET | Refills: 0 | Status: SHIPPED | OUTPATIENT
Start: 2023-11-08

## 2023-11-10 ENCOUNTER — HOSPITAL ENCOUNTER (OUTPATIENT)
Dept: ULTRASOUND IMAGING | Facility: HOSPITAL | Age: 38
End: 2023-11-10
Attending: STUDENT IN AN ORGANIZED HEALTH CARE EDUCATION/TRAINING PROGRAM
Payer: COMMERCIAL

## 2023-11-10 DIAGNOSIS — K72.90 DECOMPENSATED HEPATIC CIRRHOSIS (HCC): ICD-10-CM

## 2023-11-10 DIAGNOSIS — K74.60 DECOMPENSATED HEPATIC CIRRHOSIS (HCC): ICD-10-CM

## 2023-11-10 DIAGNOSIS — K70.11 ALCOHOLIC HEPATITIS WITH ASCITES: ICD-10-CM

## 2023-11-10 PROCEDURE — 76981 USE PARENCHYMA: CPT

## 2023-11-16 ENCOUNTER — TELEPHONE (OUTPATIENT)
Dept: GASTROENTEROLOGY | Facility: CLINIC | Age: 38
End: 2023-11-16

## 2023-12-06 ENCOUNTER — APPOINTMENT (OUTPATIENT)
Dept: LAB | Facility: HOSPITAL | Age: 38
End: 2023-12-06
Payer: COMMERCIAL

## 2023-12-06 DIAGNOSIS — F10.20 ACUTE ALCOHOLISM (HCC): ICD-10-CM

## 2023-12-06 DIAGNOSIS — I10 ESSENTIAL HYPERTENSION, MALIGNANT: ICD-10-CM

## 2023-12-06 DIAGNOSIS — E78.2 MIXED HYPERLIPIDEMIA: ICD-10-CM

## 2023-12-06 DIAGNOSIS — K72.90 HEPATIC NECROSIS (HCC): ICD-10-CM

## 2023-12-06 DIAGNOSIS — K74.60 HEPATIC CIRRHOSIS, UNSPECIFIED HEPATIC CIRRHOSIS TYPE, UNSPECIFIED WHETHER ASCITES PRESENT (HCC): ICD-10-CM

## 2023-12-06 LAB
ALBUMIN SERPL BCP-MCNC: 4.7 G/DL (ref 3.5–5)
ALP SERPL-CCNC: 65 U/L (ref 34–104)
ALT SERPL W P-5'-P-CCNC: 61 U/L (ref 7–52)
ANION GAP SERPL CALCULATED.3IONS-SCNC: 6 MMOL/L
AST SERPL W P-5'-P-CCNC: 49 U/L (ref 13–39)
BASOPHILS # BLD AUTO: 0.08 THOUSANDS/ÂΜL (ref 0–0.1)
BASOPHILS NFR BLD AUTO: 1 % (ref 0–1)
BILIRUB SERPL-MCNC: 1.21 MG/DL (ref 0.2–1)
BUN SERPL-MCNC: 15 MG/DL (ref 5–25)
CALCIUM SERPL-MCNC: 9.7 MG/DL (ref 8.4–10.2)
CHLORIDE SERPL-SCNC: 101 MMOL/L (ref 96–108)
CHOLEST SERPL-MCNC: 94 MG/DL
CO2 SERPL-SCNC: 30 MMOL/L (ref 21–32)
CREAT SERPL-MCNC: 0.97 MG/DL (ref 0.6–1.3)
EOSINOPHIL # BLD AUTO: 0.08 THOUSAND/ÂΜL (ref 0–0.61)
EOSINOPHIL NFR BLD AUTO: 1 % (ref 0–6)
ERYTHROCYTE [DISTWIDTH] IN BLOOD BY AUTOMATED COUNT: 12.5 % (ref 11.6–15.1)
EST. AVERAGE GLUCOSE BLD GHB EST-MCNC: 100 MG/DL
GFR SERPL CREATININE-BSD FRML MDRD: 98 ML/MIN/1.73SQ M
GLUCOSE P FAST SERPL-MCNC: 97 MG/DL (ref 65–99)
HBA1C MFR BLD: 5.1 %
HCT VFR BLD AUTO: 46.4 % (ref 36.5–49.3)
HDLC SERPL-MCNC: 50 MG/DL
HGB BLD-MCNC: 16.3 G/DL (ref 12–17)
IMM GRANULOCYTES # BLD AUTO: 0.01 THOUSAND/UL (ref 0–0.2)
IMM GRANULOCYTES NFR BLD AUTO: 0 % (ref 0–2)
INR PPP: 1.06 (ref 0.84–1.19)
LDLC SERPL CALC-MCNC: 23 MG/DL (ref 0–100)
LYMPHOCYTES # BLD AUTO: 2.16 THOUSANDS/ÂΜL (ref 0.6–4.47)
LYMPHOCYTES NFR BLD AUTO: 39 % (ref 14–44)
MCH RBC QN AUTO: 31.3 PG (ref 26.8–34.3)
MCHC RBC AUTO-ENTMCNC: 35.1 G/DL (ref 31.4–37.4)
MCV RBC AUTO: 89 FL (ref 82–98)
MONOCYTES # BLD AUTO: 0.6 THOUSAND/ÂΜL (ref 0.17–1.22)
MONOCYTES NFR BLD AUTO: 11 % (ref 4–12)
NEUTROPHILS # BLD AUTO: 2.61 THOUSANDS/ÂΜL (ref 1.85–7.62)
NEUTS SEG NFR BLD AUTO: 48 % (ref 43–75)
NONHDLC SERPL-MCNC: 44 MG/DL
NRBC BLD AUTO-RTO: 0 /100 WBCS
PLATELET # BLD AUTO: 155 THOUSANDS/UL (ref 149–390)
PMV BLD AUTO: 11.1 FL (ref 8.9–12.7)
POTASSIUM SERPL-SCNC: 3.6 MMOL/L (ref 3.5–5.3)
PROT SERPL-MCNC: 7.8 G/DL (ref 6.4–8.4)
PROTHROMBIN TIME: 14.4 SECONDS (ref 11.6–14.5)
RBC # BLD AUTO: 5.21 MILLION/UL (ref 3.88–5.62)
SODIUM SERPL-SCNC: 137 MMOL/L (ref 135–147)
TRIGL SERPL-MCNC: 107 MG/DL
TSH SERPL DL<=0.05 MIU/L-ACNC: 1.49 UIU/ML (ref 0.45–4.5)
WBC # BLD AUTO: 5.54 THOUSAND/UL (ref 4.31–10.16)

## 2023-12-06 PROCEDURE — 84443 ASSAY THYROID STIM HORMONE: CPT

## 2023-12-06 PROCEDURE — 85025 COMPLETE CBC W/AUTO DIFF WBC: CPT

## 2023-12-06 PROCEDURE — 83036 HEMOGLOBIN GLYCOSYLATED A1C: CPT

## 2023-12-06 PROCEDURE — 80061 LIPID PANEL: CPT

## 2023-12-06 PROCEDURE — 80053 COMPREHEN METABOLIC PANEL: CPT

## 2023-12-06 PROCEDURE — 36415 COLL VENOUS BLD VENIPUNCTURE: CPT

## 2023-12-06 PROCEDURE — 85610 PROTHROMBIN TIME: CPT

## 2023-12-11 ENCOUNTER — OFFICE VISIT (OUTPATIENT)
Dept: GASTROENTEROLOGY | Facility: CLINIC | Age: 38
End: 2023-12-11
Payer: COMMERCIAL

## 2023-12-11 VITALS
HEIGHT: 73 IN | HEART RATE: 72 BPM | DIASTOLIC BLOOD PRESSURE: 80 MMHG | BODY MASS INDEX: 32.5 KG/M2 | SYSTOLIC BLOOD PRESSURE: 122 MMHG | OXYGEN SATURATION: 97 % | WEIGHT: 245.2 LBS

## 2023-12-11 DIAGNOSIS — I85.00 ESOPHAGEAL VARICES WITHOUT BLEEDING, UNSPECIFIED ESOPHAGEAL VARICES TYPE (HCC): ICD-10-CM

## 2023-12-11 DIAGNOSIS — K74.60 DECOMPENSATED HEPATIC CIRRHOSIS (HCC): Primary | ICD-10-CM

## 2023-12-11 DIAGNOSIS — K72.90 DECOMPENSATED HEPATIC CIRRHOSIS (HCC): Primary | ICD-10-CM

## 2023-12-11 DIAGNOSIS — R79.89 ABNORMAL LIVER FUNCTION TESTS: ICD-10-CM

## 2023-12-11 PROCEDURE — 99214 OFFICE O/P EST MOD 30 MIN: CPT | Performed by: STUDENT IN AN ORGANIZED HEALTH CARE EDUCATION/TRAINING PROGRAM

## 2023-12-11 NOTE — PROGRESS NOTES
[facility] - Outpatient Consultation  Justin Arreguin 45 y.o. male MRN: [id number]  Encounter: [id number]    PCP:  Taniya Gauthier MD, [phone]  Referring Provider:  No ref. provider found,     Patient: Justin Arreguin, 1985  Reason for Referral: EtOH liver disease    ASSESSMENT/PLAN:  45 y.o. male with history of HLD, HTN, CVA and EtOH hepatitis with EtOH cirrhosis. He has no acute liver specific complaints or clinical evidence of hepatic decompensation. He was admitted to [facility]) in March 2923 for cholestatic liver injury with AST predominance and liver synthetic dysfunction on outpatient labs for work-up of abdominal pain. He reported history of heavy EtOH consumption (two 40 oz daily) and was noted to have severe EtOH hepatitis with MELD-Na 26 and DF 38. He had an MRI abdomen which showed cirrhotic liver morphology with intraabdominal varices and large amount of ascites. He had an LVP which confirmed portal hypertensive ascites that was negative for SBP. He had an EGD which showed grade 2 esophageal varices and was started on NSBB. He completed prednisolone with improvement of his liver chemistries. He has been abstinent from his diagnosis and has an updated MELD (3.0) 8 with mildly elevated transaminases and mild liver synthetic dysfunction. He had an US elastography which showed F4 disease despite prolonged abstinence which supports the diagnosis of cirrhosis but has clinically recompensated. He is otherwise up-to-date with his routine cirrhosis health maintenance and will return to clinic in 6 months or sooner if needed. Thank you for the opportunity to consult in his care. 1. Decompensated cirrhosis: MELD (3.0) 8  - Etiology: EtOH in sustained remission      2. Ascites, euvolemic   - Consider starting diuretics if he develops recurrent ascites needing drainage   - He will continue a low sodium diet (< 2g per day) to help prevent fluid retention     3.  Varices  - Last EGD showed grade 2 varices in 3/2023  - On carvedilol 6.25 mg BID for primary prophyaxis   - No further EGDs needed for screening      4. [address]  - No lesions seen on US 10/2023  - Repeat MRI abdomen in 4/2024 with AFP levels     5 Healthcare maintenance for patients with cirrhosis  -He was instructed to take no more than 2 grams of tylenol in 24 hours and no products containing NSAIDs, benzodiazapines, and narcotics. -He was also instructed to avoid raw shellfish   -He should participate in daily exercise as to prevent loss of muscle mass  -He should abstain from all alcohol intake and was counseled on this.    -He is at risk for vitamin deficiencies and metabolic bone disease.   -Needs HBV vaccination   -Additionally, he should receive a yearly flu shot and the pneumonia vaccine through his primary care provider. Gaby William MD  Division of Gastroenterology and Hepatology  [address]    ============================================================================  CC/HPI: 45 y.o. male with history of HLD, HTN, CVA and EtOH hepatitis who presents for follow up. He was last seen in September 2023. Interval events   - Had US elastography which showed F4 disease  - Last MELD 8      Extended liver history  He was admitted to [facility]) in early March for abnormal liver chemistries on outpatient labs for work-up of abdominal pain. He reported history of heavy EtOH consumption (two 40 oz daily x 10 years) and was noted to have severe EtOH hepatitis with MELD-Na 26 and DF 38. He had an MRI abdomen which showed cirrhotic liver morphology with intraabdominal varices and large amount of ascites. He was started on prednisolone and completed 28d course with improvement of his liver chemistries and updated MELD-Na 13. He has required intermittent LVPs with fluid studies consistent with portal hypertension and negative for SBP.  He was not started on diuretics but did last require and LVP in April 2023 with removal of 4.2L. He had an EGD which showed grade 2 esophageal varices and was started on nadolol. Since his diagnosis, he has been abstinent from EtOH. He states that he did not know of his liver disease prior to his hospitalization. He denies a prior history of DUI or work-related issues due to EtOH. He denies recreational drug use. He lives with his wife and has no children. He states that there is no EtOH in his home. ROS: Complete review of systems otherwise negative.      PAST MEDICAL/SURGICAL HISTORY:  Past Medical History:   Diagnosis Date    Hyperlipidemia     Hypertension     Stroke [facility])         Past Surgical History:   Procedure Laterality Date    IR PARACENTESIS  3/3/2023    IR PARACENTESIS  3/6/2023    IR PARACENTESIS  3/17/2023    IR PARACENTESIS  4/10/2023       FAMILY/SOCIAL HISTORY:  Family History   Problem Relation Age of Onset    Lymphoma Mother     Heart attack Mother     Hypertension Mother     Hypertension Father        Social History     Tobacco Use    Smoking status: Never     Passive exposure: Never    Smokeless tobacco: Former    Tobacco comments:     Patient states he is ready for tobacco cessation   Vaping Use    Vaping Use: Never used   Substance Use Topics    Alcohol use: Not Currently     Alcohol/week: 6.0 standard drinks of alcohol     Types: 6 Cans of beer per week    Drug use: Never       MEDICATIONS:  Current Outpatient Medications on File Prior to Visit   Medication Sig Dispense Refill    atorvastatin (LIPITOR) 40 mg tablet Take 1 tablet (40 mg total) by mouth daily 90 tablet 0    carvedilol (COREG) 3.125 mg tablet Take 2 tablets (6.25 mg total) by mouth 2 (two) times a day with meals 632 tablet 3    folic acid (FOLVITE) 1 mg tablet Take 1 tablet (1 mg total) by mouth daily 30 tablet 5    thiamine 100 MG tablet Take 1 tablet (100 mg total) by mouth daily 30 tablet 5    traZODone (DESYREL) 50 mg tablet Take 1 tablet (50 mg total) by mouth daily at bedtime 90 tablet 3 escitalopram (LEXAPRO) 10 mg tablet Take 1 tablet (10 mg total) by mouth daily 90 tablet 3     No current facility-administered medications on file prior to visit. No Known Allergies    PHYSICAL EXAM:  /80   Pulse 72   Ht 6' 1" (1.854 m)   Wt 111 kg (245 lb 3.2 oz)   SpO2 97%   BMI 32.35 kg/m²   GENERAL: NAD, AAO  HEENT: anicteric, OP clear, MMM  ABDOMEN: non-distended   EXTREMITIES: no edema  SKIN: no rashes, no palmar erythema, no spider angiomata   NEURO: normal gait, no tremor, no asterixis     LABS/RADIOLOGY/ENDOSCOPY:  Lab Results   Component Value Date    WBC 5.54 12/06/2023    HGB 16.3 12/06/2023    HCT 46.4 12/06/2023     12/06/2023    GLUF 97 12/06/2023    BUN 15 12/06/2023    CREATININE 0.97 12/06/2023    K 3.6 12/06/2023     12/06/2023    CO2 30 12/06/2023    ALKPHOS 65 12/06/2023    ALT 61 (H) 12/06/2023    AST 49 (H) 12/06/2023    CALCIUM 9.7 12/06/2023    EGFR 98 12/06/2023    TRIG 107 12/06/2023    HDL 50 12/06/2023    INR 1.06 12/06/2023    PTT 38 (H) 03/03/2023     AFP 7.5    US abdomen (10/2023)  Cirrhotic appearance of the liver parenchyma again noted without focal mass identified. Small amount of upper abdominal ascites. Normal hepatopetal flow in the main portal vein and primary branch segments. Spleen and left kidney not imaged on this exam.   Otherwise no significant acute intra-abdominal sonographic abnormality identified. US elastography (11/2023)  E1  16.71 kPa  E2  15.94 kPa  E3  18.82 kPa  E4  14.33 kPa  E5  16.74 kPa  E6  14.96 kPa  E7  17.67 kPa  E8  15.19 kPa  E9  17.06 kPa  E10 16.41 kPa    EGD (3/6/2023)  Four or more medium and moderate grade II varices (no red demetrius sign) in the lower third of the esophagus; no bleeding was identified  There is evidence of a moderate to severe portal gastropathy involving the cardia, fundus, body of the stomach. There is a moderate amount of food debris retained in the stomach consistent with gastroparesis.   The duodenal bulb and 2nd part of the duodenum appeared normal.     MELD 3.0: 8 at 12/6/2023  9:29 AM  MELD-Na: 8 at 12/6/2023  9:29 AM  Calculated from:  Serum Creatinine: 0.97 mg/dL (Using min of 1 mg/dL) at 12/6/2023  9:29 AM  Serum Sodium: 137 mmol/L at 12/6/2023  9:29 AM  Total Bilirubin: 1.21 mg/dL at 12/6/2023  9:29 AM  Serum Albumin: 4.7 g/dL (Using max of 3.5 g/dL) at 12/6/2023  9:29 AM  INR(ratio): 1.06 at 12/6/2023  9:29 AM  Age at listing (hypothetical): 38 years  Sex: Male at 12/6/2023  9:29 AM

## 2023-12-20 ENCOUNTER — OFFICE VISIT (OUTPATIENT)
Dept: INTERNAL MEDICINE CLINIC | Facility: CLINIC | Age: 38
End: 2023-12-20
Payer: COMMERCIAL

## 2023-12-20 VITALS
HEIGHT: 73 IN | HEART RATE: 88 BPM | WEIGHT: 245 LBS | BODY MASS INDEX: 32.47 KG/M2 | OXYGEN SATURATION: 95 % | SYSTOLIC BLOOD PRESSURE: 128 MMHG | DIASTOLIC BLOOD PRESSURE: 84 MMHG

## 2023-12-20 DIAGNOSIS — Z23 ENCOUNTER FOR IMMUNIZATION: ICD-10-CM

## 2023-12-20 DIAGNOSIS — F10.20 ALCOHOLISM (HCC): ICD-10-CM

## 2023-12-20 DIAGNOSIS — I10 PRIMARY HYPERTENSION: Primary | ICD-10-CM

## 2023-12-20 DIAGNOSIS — E78.2 MIXED HYPERLIPIDEMIA: ICD-10-CM

## 2023-12-20 DIAGNOSIS — F32.A DEPRESSION, UNSPECIFIED DEPRESSION TYPE: ICD-10-CM

## 2023-12-20 DIAGNOSIS — R73.01 IMPAIRED FASTING GLUCOSE: ICD-10-CM

## 2023-12-20 PROCEDURE — 90677 PCV20 VACCINE IM: CPT | Performed by: INTERNAL MEDICINE

## 2023-12-20 PROCEDURE — 90746 HEPB VACCINE 3 DOSE ADULT IM: CPT | Performed by: INTERNAL MEDICINE

## 2023-12-20 PROCEDURE — 99214 OFFICE O/P EST MOD 30 MIN: CPT | Performed by: INTERNAL MEDICINE

## 2023-12-20 PROCEDURE — 90471 IMMUNIZATION ADMIN: CPT | Performed by: INTERNAL MEDICINE

## 2023-12-20 PROCEDURE — 90472 IMMUNIZATION ADMIN EACH ADD: CPT | Performed by: INTERNAL MEDICINE

## 2023-12-20 NOTE — PROGRESS NOTES
Assessment/Plan:     Here for routine follow up; doing well, reviewed chronic medical problems, reviewed labs; ordered labs for next visit including CBC CMP TSH A1C LIPID;    H/o alcoholism, following with hepatology, has been sober since last hospitalization! He looks healthy and feels well, denies any cravings; hep B and PCV given today; check CMP before next visit; no ascites on exam; continue coreg, thiamine, and folic acid.    HLD, continue statin for now; check lipids before next visit;    Depression, continue lexapro and trazodone for sleep.     Quality Measures:     BMI Counseling: Body mass index is 32.32 kg/m². The BMI is above normal. Nutrition recommendations include decreasing portion sizes and encouraging healthy choices of fruits and vegetables. Exercise recommendations include moderate physical activity 150 minutes/week. Rationale for BMI follow-up plan is due to patient being overweight or obese.        Return in about 6 months (around 6/20/2024).    No problem-specific Assessment & Plan notes found for this encounter.       Diagnoses and all orders for this visit:    Primary hypertension  -     CBC and differential; Future  -     TSH, 3rd generation with Free T4 reflex; Future  -     Comprehensive metabolic panel; Future    Mixed hyperlipidemia  -     Comprehensive metabolic panel; Future  -     Lipid Panel with Direct LDL reflex; Future    Depression, unspecified depression type    Alcoholism (HCC)    Impaired fasting glucose  -     Hemoglobin A1C; Future    Encounter for immunization  -     Pneumococcal Conjugate Vaccine 20-valent (Pcv20)          Subjective:      Patient ID: Wenceslao Burrell is a 38 y.o. male.    Here for routine f/u.      ALLERGIES:  No Known Allergies    CURRENT MEDICATIONS:    Current Outpatient Medications:   •  atorvastatin (LIPITOR) 40 mg tablet, Take 1 tablet (40 mg total) by mouth daily, Disp: 90 tablet, Rfl: 0  •  carvedilol (COREG) 3.125 mg tablet, Take 2 tablets (6.25  mg total) by mouth 2 (two) times a day with meals, Disp: 360 tablet, Rfl: 3  •  escitalopram (LEXAPRO) 10 mg tablet, Take 1 tablet (10 mg total) by mouth daily, Disp: 90 tablet, Rfl: 3  •  folic acid (FOLVITE) 1 mg tablet, Take 1 tablet (1 mg total) by mouth daily, Disp: 30 tablet, Rfl: 5  •  thiamine 100 MG tablet, Take 1 tablet (100 mg total) by mouth daily, Disp: 30 tablet, Rfl: 5  •  traZODone (DESYREL) 50 mg tablet, Take 1 tablet (50 mg total) by mouth daily at bedtime, Disp: 90 tablet, Rfl: 3    ACTIVE PROBLEM LIST:  Patient Active Problem List   Diagnosis   • Mixed hyperlipidemia   • Depression   • Primary hypertension   • Alcoholism (HCC)       PAST MEDICAL HISTORY:  Past Medical History:   Diagnosis Date   • Anxiety    • Depression    • Hyperlipidemia    • Hypertension    • Stroke (HCC)    • Substance abuse (HCC)        PAST SURGICAL HISTORY:  Past Surgical History:   Procedure Laterality Date   • IR PARACENTESIS  3/3/2023   • IR PARACENTESIS  3/6/2023   • IR PARACENTESIS  3/17/2023   • IR PARACENTESIS  4/10/2023       FAMILY HISTORY:  Family History   Problem Relation Age of Onset   • Lymphoma Mother    • Heart attack Mother    • Hypertension Mother    • Heart disease Mother    • Cancer Mother    • Hypertension Father        SOCIAL HISTORY:  Social History     Socioeconomic History   • Marital status: Single     Spouse name: Not on file   • Number of children: Not on file   • Years of education: Not on file   • Highest education level: Not on file   Occupational History   • Not on file   Tobacco Use   • Smoking status: Former     Types: Cigars     Passive exposure: Yes   • Smokeless tobacco: Former     Types: Snuff   • Tobacco comments:     Patient states he is ready for tobacco cessation   Vaping Use   • Vaping status: Never Used   Substance and Sexual Activity   • Alcohol use: Not Currently     Alcohol/week: 6.0 standard drinks of alcohol     Types: 6 Cans of beer per week   • Drug use: Never   • Sexual  "activity: Not Currently     Partners: Female     Birth control/protection: Condom Male   Other Topics Concern   • Not on file   Social History Narrative   • Not on file     Social Determinants of Health     Financial Resource Strain: Not on file   Food Insecurity: Not on file   Transportation Needs: Not on file   Physical Activity: Not on file   Stress: Not on file   Social Connections: Not on file   Intimate Partner Violence: Not on file   Housing Stability: Not on file       Review of Systems   Constitutional:  Negative for chills and fever.   HENT:  Negative for ear pain and sore throat.    Eyes:  Negative for pain and visual disturbance.   Respiratory:  Negative for cough and shortness of breath.    Cardiovascular:  Negative for chest pain and palpitations.   Gastrointestinal:  Negative for abdominal pain and vomiting.   Genitourinary:  Negative for dysuria and hematuria.   Musculoskeletal:  Negative for arthralgias and back pain.   Skin:  Negative for color change and rash.   Neurological:  Negative for seizures and syncope.   All other systems reviewed and are negative.        Objective:  Vitals:    12/20/23 1131   BP: 128/84   BP Location: Left arm   Patient Position: Sitting   Cuff Size: Adult   Pulse: 88   SpO2: 95%   Weight: 111 kg (245 lb)   Height: 6' 1\" (1.854 m)     Body mass index is 32.32 kg/m².     Physical Exam  Vitals and nursing note reviewed.   Constitutional:       Appearance: Normal appearance.   HENT:      Head: Normocephalic and atraumatic.   Cardiovascular:      Rate and Rhythm: Normal rate and regular rhythm.      Heart sounds: Normal heart sounds.   Pulmonary:      Effort: Pulmonary effort is normal.      Breath sounds: Normal breath sounds.   Musculoskeletal:         General: Normal range of motion.      Cervical back: Normal range of motion.   Lymphadenopathy:      Cervical: No cervical adenopathy.   Skin:     General: Skin is warm and dry.   Neurological:      General: No focal deficit " present.      Mental Status: He is alert and oriented to person, place, and time. Mental status is at baseline.   Psychiatric:         Mood and Affect: Mood normal.         RESULTS:  Hemoglobin A1C   Date/Time Value Ref Range Status   12/06/2023 09:29 AM 5.1 Normal 4.0-5.6%; PreDiabetic 5.7-6.4%; Diabetic >=6.5%; Glycemic control for adults with diabetes <7.0% % Final     Cholesterol   Date/Time Value Ref Range Status   12/06/2023 09:29 AM 94 See Comment mg/dL Final     Comment:     Cholesterol:         Pediatric <18 Years        Desirable          <170 mg/dL      Borderline High    170-199 mg/dL      High               >=200 mg/dL        Adult >=18 Years            Desirable        <200 mg/dL      Borderline High  200-239 mg/dL      High             >239 mg/dL       Triglycerides   Date/Time Value Ref Range Status   12/06/2023 09:29  See Comment mg/dL Final     Comment:     Triglyceride:     0-9Y            <75mg/dL     10Y-17Y         <90 mg/dL       >=18Y     Normal          <150 mg/dL     Borderline High 150-199 mg/dL     High            200-499 mg/dL        Very High       >499 mg/dL    Specimen collection should occur prior to Metamizole administration due to the potential for falsely depressed results.     HDL, Direct   Date/Time Value Ref Range Status   12/06/2023 09:29 AM 50 >=40 mg/dL Final     LDL Calculated   Date/Time Value Ref Range Status   12/06/2023 09:29 AM 23 0 - 100 mg/dL Final     Comment:     LDL Cholesterol:     Optimal           <100 mg/dl     Near Optimal      100-129 mg/dl     Above Optimal       Borderline High 130-159 mg/dl       High            160-189 mg/dl       Very High       >189 mg/dl         This screening LDL is a calculated result.   It does not have the accuracy of the Direct Measured LDL in the monitoring of patients with hyperlipidemia and/or statin therapy.   Direct Measure LDL (FPW821) must be ordered separately in these patients.     Hemoglobin   Date/Time Value Ref  Range Status   12/06/2023 09:29 AM 16.3 12.0 - 17.0 g/dL Final     Hematocrit   Date/Time Value Ref Range Status   12/06/2023 09:29 AM 46.4 36.5 - 49.3 % Final     Platelets   Date/Time Value Ref Range Status   12/06/2023 09:29  149 - 390 Thousands/uL Final     TSH 3RD GENERATON   Date/Time Value Ref Range Status   12/06/2023 09:29 AM 1.485 0.450 - 4.500 uIU/mL Final     Comment:     Adult TSH (3rd generation) reference range follows the recommended guidelines of the American Thyroid Association, January, 2020.     Sodium   Date/Time Value Ref Range Status   12/06/2023 09:29  135 - 147 mmol/L Final     BUN   Date/Time Value Ref Range Status   12/06/2023 09:29 AM 15 5 - 25 mg/dL Final     Creatinine   Date/Time Value Ref Range Status   12/06/2023 09:29 AM 0.97 0.60 - 1.30 mg/dL Final     Comment:     Standardized to IDMS reference method      In chart    This note was created with voice recognition software.  Phonic, grammatical and spelling errors may be present within the note as a result.

## 2024-02-01 DIAGNOSIS — E78.2 MIXED HYPERLIPIDEMIA: ICD-10-CM

## 2024-02-01 RX ORDER — ATORVASTATIN CALCIUM 40 MG/1
40 TABLET, FILM COATED ORAL DAILY
Qty: 90 TABLET | Refills: 0 | Status: SHIPPED | OUTPATIENT
Start: 2024-02-01

## 2024-03-25 DIAGNOSIS — F10.20 ALCOHOLISM (HCC): ICD-10-CM

## 2024-03-25 RX ORDER — LANOLIN ALCOHOL/MO/W.PET/CERES
100 CREAM (GRAM) TOPICAL DAILY
Qty: 30 TABLET | Refills: 5 | Status: SHIPPED | OUTPATIENT
Start: 2024-03-25

## 2024-03-25 RX ORDER — FOLIC ACID 1 MG/1
1 TABLET ORAL DAILY
Qty: 30 TABLET | Refills: 5 | Status: SHIPPED | OUTPATIENT
Start: 2024-03-25

## 2024-04-01 DIAGNOSIS — F51.04 PSYCHOPHYSIOLOGICAL INSOMNIA: ICD-10-CM

## 2024-04-01 DIAGNOSIS — F32.A DEPRESSION, UNSPECIFIED DEPRESSION TYPE: ICD-10-CM

## 2024-04-01 RX ORDER — TRAZODONE HYDROCHLORIDE 50 MG/1
50 TABLET ORAL
Qty: 90 TABLET | Refills: 0 | Status: SHIPPED | OUTPATIENT
Start: 2024-04-01

## 2024-04-01 RX ORDER — ESCITALOPRAM OXALATE 10 MG/1
10 TABLET ORAL DAILY
Qty: 90 TABLET | Refills: 0 | Status: SHIPPED | OUTPATIENT
Start: 2024-04-01 | End: 2024-06-30

## 2024-04-18 ENCOUNTER — APPOINTMENT (OUTPATIENT)
Dept: LAB | Facility: HOSPITAL | Age: 39
End: 2024-04-18
Payer: COMMERCIAL

## 2024-04-18 DIAGNOSIS — K72.90 DECOMPENSATED HEPATIC CIRRHOSIS (HCC): ICD-10-CM

## 2024-04-18 DIAGNOSIS — K74.60 DECOMPENSATED HEPATIC CIRRHOSIS (HCC): ICD-10-CM

## 2024-04-18 LAB
AFP-TM SERPL-MCNC: 2.92 NG/ML (ref 0–9)
ALBUMIN SERPL BCP-MCNC: 4.7 G/DL (ref 3.5–5)
ALP SERPL-CCNC: 61 U/L (ref 34–104)
ALT SERPL W P-5'-P-CCNC: 59 U/L (ref 7–52)
ANION GAP SERPL CALCULATED.3IONS-SCNC: 5 MMOL/L (ref 4–13)
AST SERPL W P-5'-P-CCNC: 47 U/L (ref 13–39)
BASOPHILS # BLD AUTO: 0.05 THOUSANDS/ÂΜL (ref 0–0.1)
BASOPHILS NFR BLD AUTO: 1 % (ref 0–1)
BILIRUB SERPL-MCNC: 1.09 MG/DL (ref 0.2–1)
BUN SERPL-MCNC: 11 MG/DL (ref 5–25)
CALCIUM SERPL-MCNC: 9.6 MG/DL (ref 8.4–10.2)
CHLORIDE SERPL-SCNC: 106 MMOL/L (ref 96–108)
CO2 SERPL-SCNC: 28 MMOL/L (ref 21–32)
CREAT SERPL-MCNC: 0.96 MG/DL (ref 0.6–1.3)
EOSINOPHIL # BLD AUTO: 0.06 THOUSAND/ÂΜL (ref 0–0.61)
EOSINOPHIL NFR BLD AUTO: 1 % (ref 0–6)
ERYTHROCYTE [DISTWIDTH] IN BLOOD BY AUTOMATED COUNT: 12.8 % (ref 11.6–15.1)
GFR SERPL CREATININE-BSD FRML MDRD: 99 ML/MIN/1.73SQ M
GLUCOSE P FAST SERPL-MCNC: 102 MG/DL (ref 65–99)
HCT VFR BLD AUTO: 44.7 % (ref 36.5–49.3)
HGB BLD-MCNC: 15.5 G/DL (ref 12–17)
IMM GRANULOCYTES # BLD AUTO: 0.01 THOUSAND/UL (ref 0–0.2)
IMM GRANULOCYTES NFR BLD AUTO: 0 % (ref 0–2)
INR PPP: 1.05 (ref 0.84–1.19)
LYMPHOCYTES # BLD AUTO: 1.94 THOUSANDS/ÂΜL (ref 0.6–4.47)
LYMPHOCYTES NFR BLD AUTO: 41 % (ref 14–44)
MCH RBC QN AUTO: 30.3 PG (ref 26.8–34.3)
MCHC RBC AUTO-ENTMCNC: 34.7 G/DL (ref 31.4–37.4)
MCV RBC AUTO: 87 FL (ref 82–98)
MONOCYTES # BLD AUTO: 0.43 THOUSAND/ÂΜL (ref 0.17–1.22)
MONOCYTES NFR BLD AUTO: 9 % (ref 4–12)
NEUTROPHILS # BLD AUTO: 2.29 THOUSANDS/ÂΜL (ref 1.85–7.62)
NEUTS SEG NFR BLD AUTO: 48 % (ref 43–75)
NRBC BLD AUTO-RTO: 0 /100 WBCS
PLATELET # BLD AUTO: 134 THOUSANDS/UL (ref 149–390)
PMV BLD AUTO: 10.8 FL (ref 8.9–12.7)
POTASSIUM SERPL-SCNC: 4 MMOL/L (ref 3.5–5.3)
PROT SERPL-MCNC: 7.7 G/DL (ref 6.4–8.4)
PROTHROMBIN TIME: 14.3 SECONDS (ref 11.6–14.5)
RBC # BLD AUTO: 5.12 MILLION/UL (ref 3.88–5.62)
SODIUM SERPL-SCNC: 139 MMOL/L (ref 135–147)
WBC # BLD AUTO: 4.78 THOUSAND/UL (ref 4.31–10.16)

## 2024-04-18 PROCEDURE — 85610 PROTHROMBIN TIME: CPT

## 2024-04-18 PROCEDURE — 36415 COLL VENOUS BLD VENIPUNCTURE: CPT

## 2024-04-18 PROCEDURE — 82105 ALPHA-FETOPROTEIN SERUM: CPT

## 2024-04-18 PROCEDURE — 85025 COMPLETE CBC W/AUTO DIFF WBC: CPT

## 2024-04-18 PROCEDURE — 80053 COMPREHEN METABOLIC PANEL: CPT

## 2024-04-20 DIAGNOSIS — I85.00 ESOPHAGEAL VARICES WITHOUT BLEEDING, UNSPECIFIED ESOPHAGEAL VARICES TYPE (HCC): ICD-10-CM

## 2024-04-20 DIAGNOSIS — K72.90 DECOMPENSATED HEPATIC CIRRHOSIS (HCC): ICD-10-CM

## 2024-04-20 DIAGNOSIS — K74.60 DECOMPENSATED HEPATIC CIRRHOSIS (HCC): ICD-10-CM

## 2024-04-22 ENCOUNTER — TELEPHONE (OUTPATIENT)
Age: 39
End: 2024-04-22

## 2024-04-22 ENCOUNTER — HOSPITAL ENCOUNTER (OUTPATIENT)
Dept: MRI IMAGING | Facility: HOSPITAL | Age: 39
Discharge: HOME | End: 2024-04-22
Attending: STUDENT IN AN ORGANIZED HEALTH CARE EDUCATION/TRAINING PROGRAM
Payer: COMMERCIAL

## 2024-04-22 DIAGNOSIS — K74.60 DECOMPENSATED HEPATIC CIRRHOSIS (HCC): ICD-10-CM

## 2024-04-22 DIAGNOSIS — K72.90 DECOMPENSATED HEPATIC CIRRHOSIS (HCC): ICD-10-CM

## 2024-04-22 PROCEDURE — 74183 MRI ABD W/O CNTR FLWD CNTR: CPT

## 2024-04-22 PROCEDURE — A9585 GADOBUTROL INJECTION: HCPCS | Performed by: STUDENT IN AN ORGANIZED HEALTH CARE EDUCATION/TRAINING PROGRAM

## 2024-04-22 PROCEDURE — G1004 CDSM NDSC: HCPCS

## 2024-04-22 RX ORDER — CARVEDILOL 3.12 MG/1
6.25 TABLET ORAL 2 TIMES DAILY WITH MEALS
Qty: 360 TABLET | Refills: 0 | Status: SHIPPED | OUTPATIENT
Start: 2024-04-22

## 2024-04-22 RX ORDER — GADOBUTROL 604.72 MG/ML
11 INJECTION INTRAVENOUS
Status: COMPLETED | OUTPATIENT
Start: 2024-04-22 | End: 2024-04-22

## 2024-04-22 RX ADMIN — GADOBUTROL 11 ML: 604.72 INJECTION INTRAVENOUS at 08:31

## 2024-04-22 NOTE — TELEPHONE ENCOUNTER
Message from SS  Closed    Close reason: Prior Authorization not required for patient/medication  Payer: [facility]  Note from payer: Available without authorization.  View History  Medication Being Authorized    carvedilol (COREG) 3.125 mg tablet  TAKE 2 TABLETS BY MOUTH TWICE DAILY WITH MEALS  Dispense: 360 tablet Refills: 0   Start: 4/22/2024   Class: Normal Diagnoses: Decompensated hepatic cirrhosis (HCC); Esophageal varices without bleeding, unspecified esophageal varices type (HCC)   This order has been released to its destination.  To be filled at: [facility] [address] [place] [address] - [facility] [address]

## 2024-04-29 DIAGNOSIS — E78.2 MIXED HYPERLIPIDEMIA: ICD-10-CM

## 2024-04-30 ENCOUNTER — TELEPHONE (OUTPATIENT)
Age: 39
End: 2024-04-30

## 2024-04-30 RX ORDER — ATORVASTATIN CALCIUM 40 MG/1
40 TABLET, FILM COATED ORAL DAILY
Qty: 90 TABLET | Refills: 1 | Status: SHIPPED | OUTPATIENT
Start: 2024-04-30

## 2024-04-30 NOTE — TELEPHONE ENCOUNTER
Patients GI provider:  Dr. BUCKLEY    Number to return call: [phone]  Radiology    Reason for call:  radiology contacted office with significant findings on recent MRI. Please review findings.    Scheduled procedure/appointment date if applicable: 6/11/2024 Yelena Melendez

## 2024-05-02 DIAGNOSIS — K74.60 DECOMPENSATED HEPATIC CIRRHOSIS (HCC): Primary | ICD-10-CM

## 2024-05-02 DIAGNOSIS — K72.90 DECOMPENSATED HEPATIC CIRRHOSIS (HCC): Primary | ICD-10-CM

## 2024-06-04 ENCOUNTER — APPOINTMENT (OUTPATIENT)
Dept: LAB | Facility: HOSPITAL | Age: 39
End: 2024-06-04
Payer: COMMERCIAL

## 2024-06-04 DIAGNOSIS — I10 PRIMARY HYPERTENSION: ICD-10-CM

## 2024-06-04 DIAGNOSIS — R73.01 IMPAIRED FASTING GLUCOSE: ICD-10-CM

## 2024-06-04 DIAGNOSIS — E78.2 MIXED HYPERLIPIDEMIA: ICD-10-CM

## 2024-06-04 LAB
ALBUMIN SERPL BCP-MCNC: 4.7 G/DL (ref 3.5–5)
ALP SERPL-CCNC: 57 U/L (ref 34–104)
ALT SERPL W P-5'-P-CCNC: 66 U/L (ref 7–52)
ANION GAP SERPL CALCULATED.3IONS-SCNC: 4 MMOL/L (ref 4–13)
AST SERPL W P-5'-P-CCNC: 50 U/L (ref 13–39)
BASOPHILS # BLD AUTO: 0.05 THOUSANDS/ÂΜL (ref 0–0.1)
BASOPHILS NFR BLD AUTO: 1 % (ref 0–1)
BILIRUB SERPL-MCNC: 1.17 MG/DL (ref 0.2–1)
BUN SERPL-MCNC: 12 MG/DL (ref 5–25)
CALCIUM SERPL-MCNC: 9.7 MG/DL (ref 8.4–10.2)
CHLORIDE SERPL-SCNC: 105 MMOL/L (ref 96–108)
CHOLEST SERPL-MCNC: 99 MG/DL
CO2 SERPL-SCNC: 29 MMOL/L (ref 21–32)
CREAT SERPL-MCNC: 1.08 MG/DL (ref 0.6–1.3)
EOSINOPHIL # BLD AUTO: 0.06 THOUSAND/ÂΜL (ref 0–0.61)
EOSINOPHIL NFR BLD AUTO: 1 % (ref 0–6)
ERYTHROCYTE [DISTWIDTH] IN BLOOD BY AUTOMATED COUNT: 12.5 % (ref 11.6–15.1)
EST. AVERAGE GLUCOSE BLD GHB EST-MCNC: 94 MG/DL
GFR SERPL CREATININE-BSD FRML MDRD: 86 ML/MIN/1.73SQ M
GLUCOSE P FAST SERPL-MCNC: 103 MG/DL (ref 65–99)
HBA1C MFR BLD: 4.9 %
HCT VFR BLD AUTO: 45.5 % (ref 36.5–49.3)
HDLC SERPL-MCNC: 49 MG/DL
HGB BLD-MCNC: 15.7 G/DL (ref 12–17)
IMM GRANULOCYTES # BLD AUTO: 0 THOUSAND/UL (ref 0–0.2)
IMM GRANULOCYTES NFR BLD AUTO: 0 % (ref 0–2)
LDLC SERPL CALC-MCNC: 30 MG/DL (ref 0–100)
LYMPHOCYTES # BLD AUTO: 1.96 THOUSANDS/ÂΜL (ref 0.6–4.47)
LYMPHOCYTES NFR BLD AUTO: 38 % (ref 14–44)
MCH RBC QN AUTO: 30.2 PG (ref 26.8–34.3)
MCHC RBC AUTO-ENTMCNC: 34.5 G/DL (ref 31.4–37.4)
MCV RBC AUTO: 88 FL (ref 82–98)
MONOCYTES # BLD AUTO: 0.44 THOUSAND/ÂΜL (ref 0.17–1.22)
MONOCYTES NFR BLD AUTO: 9 % (ref 4–12)
NEUTROPHILS # BLD AUTO: 2.61 THOUSANDS/ÂΜL (ref 1.85–7.62)
NEUTS SEG NFR BLD AUTO: 51 % (ref 43–75)
NRBC BLD AUTO-RTO: 0 /100 WBCS
PLATELET # BLD AUTO: 146 THOUSANDS/UL (ref 149–390)
PMV BLD AUTO: 10.5 FL (ref 8.9–12.7)
POTASSIUM SERPL-SCNC: 4.5 MMOL/L (ref 3.5–5.3)
PROT SERPL-MCNC: 7.8 G/DL (ref 6.4–8.4)
RBC # BLD AUTO: 5.2 MILLION/UL (ref 3.88–5.62)
SODIUM SERPL-SCNC: 138 MMOL/L (ref 135–147)
TRIGL SERPL-MCNC: 100 MG/DL
TSH SERPL DL<=0.05 MIU/L-ACNC: 1.37 UIU/ML (ref 0.45–4.5)
WBC # BLD AUTO: 5.12 THOUSAND/UL (ref 4.31–10.16)

## 2024-06-04 PROCEDURE — 80053 COMPREHEN METABOLIC PANEL: CPT

## 2024-06-04 PROCEDURE — 85025 COMPLETE CBC W/AUTO DIFF WBC: CPT

## 2024-06-04 PROCEDURE — 80061 LIPID PANEL: CPT

## 2024-06-04 PROCEDURE — 84443 ASSAY THYROID STIM HORMONE: CPT

## 2024-06-04 PROCEDURE — 83036 HEMOGLOBIN GLYCOSYLATED A1C: CPT

## 2024-06-04 PROCEDURE — 36415 COLL VENOUS BLD VENIPUNCTURE: CPT

## 2024-06-11 ENCOUNTER — OFFICE VISIT (OUTPATIENT)
Dept: GASTROENTEROLOGY | Facility: CLINIC | Age: 39
End: 2024-06-11
Payer: COMMERCIAL

## 2024-06-11 VITALS
WEIGHT: 245.4 LBS | DIASTOLIC BLOOD PRESSURE: 62 MMHG | SYSTOLIC BLOOD PRESSURE: 122 MMHG | TEMPERATURE: 98.2 F | OXYGEN SATURATION: 97 % | HEIGHT: 73 IN | HEART RATE: 68 BPM | BODY MASS INDEX: 32.52 KG/M2

## 2024-06-11 DIAGNOSIS — K70.11 ALCOHOLIC HEPATITIS WITH ASCITES: ICD-10-CM

## 2024-06-11 DIAGNOSIS — R79.89 ABNORMAL LIVER FUNCTION TESTS: ICD-10-CM

## 2024-06-11 DIAGNOSIS — K70.30 ALCOHOLIC CIRRHOSIS OF LIVER WITHOUT ASCITES (HCC): Primary | ICD-10-CM

## 2024-06-11 DIAGNOSIS — I85.00 ESOPHAGEAL VARICES WITHOUT BLEEDING, UNSPECIFIED ESOPHAGEAL VARICES TYPE (HCC): ICD-10-CM

## 2024-06-11 PROCEDURE — 99214 OFFICE O/P EST MOD 30 MIN: CPT | Performed by: FAMILY MEDICINE

## 2024-06-11 NOTE — PROGRESS NOTES
[facility] - Outpatient Follow-up  Wenceslao Burrell 39 y.o. male MRN: [id number]  Encounter: [id number]          ASSESSMENT AND PLAN:      1. Alcoholic cirrhosis of liver without ascites (HCC)  2. Esophageal varices without bleeding, unspecified esophageal varices type (HCC)  3. Abnormal liver function tests  4. Alcoholic hepatitis with ascites  5. Liver lesion    Summary: Patient is a 39 y.o. male with a history of EtOH hepatitis and cirrhosis secondary to EtOH d/b non-bleeding esophageal varices. Current MELD- 3.0 (7).    Patient was admitted in March 2023 for severe EtOH hepatitis (MELD-Na 26 and MDF 38) requiring steroids having had an MRI abdomen showing cirrhotic liver morphology and portal hypertension as evidenced by intra-abdominal varices and large volume ascites. He underwent an LVP with fluid analysis consistent with portal hypertensive ascites and negative for SBP. EGD (3/2023) showed grade 2 esophageal varices and he was started on a nonselective beta-blocker. He ultimately had a US elastography, following a prolonged period of sobriety, confirming F4 disease.    He continues to report maintained sobriety and I applauded his efforts. He appears to have had clinical re-compensation because of this. We reviewed his most recent labs notable for mildly elevated transaminases and chronic thrombocytopenia - Stable when compared to previous. We also reviewed his MRI which showed a 1.6 x 1.1 LR 3 liver lesion for which she is scheduled for repeat MRI abdomen (hepatoma protocol) in October 2024. AFP normal.     Otherwise, he is up-to-date with his routine healthcare maintenance for cirrhosis. He will continue carvedilol 6.25 mg twice daily for primary prophylaxis of EVB and have updated MELD labs drawn just prior to his next follow-up appointment in 6 months. Additional management as below.    Ascites   - Hx of ascites requiring paracentesis but with clinical  re-compensation   - No radiographic or clinical evidence of ascites on exam today  - Encouraged to adhere to a low-sodium (<2000 mg daily) diet.     Esophageal Varices   - EGD (3/2023) with grade 2 esophageal varices  - Continue carvedilol 6.25 mg BID for primary ppx  - No further EGDs necessary for variceal screening barring a decompensating event.     Hepatic Encephalopathy   - No history or evidence of HE on exam today.   - Patient aware of signs/sxs's of HE and advised to promptly inform provider if experiencing such.    HCC Screening   - MRI (4/2024) with a 1.6 x 1.1 LR3 lesion. AFP normal.   - Scheduled for a repeat MR abdomen (hepatoma protocol) in October 2024.   - AFP levels to be drawn with his next set of routine labs.   - Continue imaging and AFP levels q6 months.    Nutritional Status  - No sarcopenia noted on exam today.   - Encouraged high-protein diet in addition to a high-protein snack qHS to prevent prolonged fasting.     Vaccines   - Reportedly vaccinated for hep B given non-immunity.     Transplant Candidacy   - Defer given re-compensation with sobriety and low-MELD score    - CBC and differential; Future  - Comprehensive metabolic panel; Future  - Protime-INR; Future  - AFP tumor marker; Future    Follow-up in 6 months or sooner if necessary.   ______________________________________________________________________    HPI: Patient is a 39 y.o. male with PMH significant for HLD, HTN, CVA and EtOH hepatitis who presents today for follow-up regarding cirrhosis secondary to EtOH.    Interval history  - Reports maintained sobriety.   - Most recent labs (6/4/2024 and 4/18/2024) overall stable when compared to prior.  - MRI (4/2024) notable for a 1.6 x 1.1 LR 3 liver lesion for which she is scheduled for repeat MRI in October 2024.  - Notices that he has been bruising more easily.    Extended liver history  He was admitted to  in early March for abnormal liver chemistries on outpatient labs for  work-up of abdominal pain. He reported history of heavy EtOH consumption (two 40 oz daily x 10 years) and was noted to have severe EtOH hepatitis with MELD-Na 26 and DF 38. He had an MRI abdomen which showed cirrhotic liver morphology with intraabdominal varices and large amount of ascites. He was started on prednisolone and completed 28d course with improvement of his liver chemistries and updated MELD-Na 13. He has required intermittent LVPs with fluid studies consistent with portal hypertension and negative for SBP. He was not started on diuretics but did last require and LVP in April 2023 with removal of 4.2L. He had an EGD which showed grade 2 esophageal varices and was started on nadolol.      Since his diagnosis, he has been abstinent from EtOH. He states that he did not know of his liver disease prior to his hospitalization. He denies a prior history of DUI or work-related issues due to EtOH. He denies recreational drug use. He lives with his wife and has no children. He states that there is no EtOH in his home.     US elastography with F4 disease.    MELD 3.0: 7 at 4/18/2024  9:25 AM  MELD-Na: 8 at 4/18/2024  9:25 AM  Calculated from:  Serum Creatinine: 0.96 mg/dL (Using min of 1 mg/dL) at 4/18/2024  9:25 AM  Serum Sodium: 139 mmol/L (Using max of 137 mmol/L) at 4/18/2024  9:25 AM  Total Bilirubin: 1.09 mg/dL at 4/18/2024  9:25 AM  Serum Albumin: 4.7 g/dL (Using max of 3.5 g/dL) at 4/18/2024  9:25 AM  INR(ratio): 1.05 at 4/18/2024  9:25 AM  Age at listing (hypothetical): 39 years  Sex: Male at 4/18/2024  9:25 AM      REVIEW OF SYSTEMS:    CONSTITUTIONAL: Denies any fever, chills, rigors, and weight loss.  HEENT: No earache or tinnitus. Denies hearing loss or visual disturbances.  CARDIOVASCULAR: No chest pain or palpitations.   RESPIRATORY: Denies any cough, hemoptysis, shortness of breath or dyspnea on exertion.  GASTROINTESTINAL: As noted in the History of Present Illness.   GENITOURINARY: No problems  "with urination. Denies any hematuria or dysuria.  NEUROLOGIC: No dizziness or vertigo, denies headaches.   MUSCULOSKELETAL: Denies any muscle or joint pain.   SKIN: Denies skin rashes or itching.   ENDOCRINE: Denies excessive thirst. Denies intolerance to heat or cold.  PSYCHOSOCIAL: Denies depression or anxiety. Denies any recent memory loss.       Historical Information   Past Medical History:   Diagnosis Date   • Anxiety    • Depression    • Hyperlipidemia    • Hypertension    • Stroke (HCC)    • Substance abuse (HCC)      Past Surgical History:   Procedure Laterality Date   • IR PARACENTESIS  3/3/2023   • IR PARACENTESIS  3/6/2023   • IR PARACENTESIS  3/17/2023   • IR PARACENTESIS  4/10/2023     Social History   Social History     Substance and Sexual Activity   Alcohol Use Not Currently   • Alcohol/week: 6.0 standard drinks of alcohol   • Types: 6 Cans of beer per week     Social History     Substance and Sexual Activity   Drug Use Never     Social History     Tobacco Use   Smoking Status Former   • Types: Cigars   • Passive exposure: Yes   Smokeless Tobacco Former   • Types: Snuff   Tobacco Comments    Patient states he is ready for tobacco cessation     Family History   Problem Relation Age of Onset   • Lymphoma Mother    • Heart attack Mother    • Hypertension Mother    • Heart disease Mother    • Cancer Mother    • Hypertension Father        Meds/Allergies       Current Outpatient Medications:   •  atorvastatin (LIPITOR) 40 mg tablet  •  carvedilol (COREG) 3.125 mg tablet  •  escitalopram (LEXAPRO) 10 mg tablet  •  folic acid (FOLVITE) 1 mg tablet  •  thiamine 100 MG tablet  •  traZODone (DESYREL) 50 mg tablet    No Known Allergies        Objective     Blood pressure 122/62, pulse 68, temperature 98.2 °F (36.8 °C), temperature source Tympanic, height 6' 1\" (1.854 m), weight 111 kg (245 lb 6.4 oz), SpO2 97%. Body mass index is 32.38 kg/m².        PHYSICAL EXAM:      General Appearance:   Alert, cooperative, " no distress; AAOx3, no asterixis   HEENT:   Normocephalic, atraumatic, anicteric; No scleral icterus     Neck:  Supple, symmetrical, trachea midline   Lungs:   Clear to auscultation bilaterally; no rales, rhonchi or wheezing; respirations unlabored    Heart::   Regular rate and rhythm; no murmur, rub, or gallop.   Abdomen:   Soft, non-tender, non-distended; normal bowel sounds; no masses, no organomegaly    Genitalia:   Deferred    Rectal:   Deferred    Extremities:  No palmar erythema, cyanosis, clubbing or edema    Pulses:  2+ and symmetric    Skin:  No spider angiomas, jaundice, rashes, or lesions    Lymph nodes:  No palpable cervical lymphadenopathy        Lab Results:   No visits with results within 1 Day(s) from this visit.   Latest known visit with results is:   Appointment on 06/04/2024   Component Date Value   • WBC 06/04/2024 5.12    • RBC 06/04/2024 5.20    • Hemoglobin 06/04/2024 15.7    • Hematocrit 06/04/2024 45.5    • MCV 06/04/2024 88    • MCH 06/04/2024 30.2    • MCHC 06/04/2024 34.5    • RDW 06/04/2024 12.5    • MPV 06/04/2024 10.5    • Platelets 06/04/2024 146 (L)    • nRBC 06/04/2024 0    • Segmented % 06/04/2024 51    • Immature Grans % 06/04/2024 0    • Lymphocytes % 06/04/2024 38    • Monocytes % 06/04/2024 9    • Eosinophils Relative 06/04/2024 1    • Basophils Relative 06/04/2024 1    • Absolute Neutrophils 06/04/2024 2.61    • Absolute Immature Grans 06/04/2024 0.00    • Absolute Lymphocytes 06/04/2024 1.96    • Absolute Monocytes 06/04/2024 0.44    • Eosinophils Absolute 06/04/2024 0.06    • Basophils Absolute 06/04/2024 0.05    • TSH 3RD GENERATON 06/04/2024 1.366    • Sodium 06/04/2024 138    • Potassium 06/04/2024 4.5    • Chloride 06/04/2024 105    • CO2 06/04/2024 29    • ANION GAP 06/04/2024 4    • BUN 06/04/2024 12    • Creatinine 06/04/2024 1.08    • Glucose, Fasting 06/04/2024 103 (H)    • Calcium 06/04/2024 9.7    • AST 06/04/2024 50 (H)    • ALT 06/04/2024 66 (H)    • Alkaline  Phosphatase 06/04/2024 57    • Total Protein 06/04/2024 7.8    • Albumin 06/04/2024 4.7    • Total Bilirubin 06/04/2024 1.17 (H)    • eGFR 06/04/2024 86    • Hemoglobin A1C 06/04/2024 4.9    • EAG 06/04/2024 94    • Cholesterol 06/04/2024 99    • Triglycerides 06/04/2024 100    • HDL, Direct 06/04/2024 49    • LDL Calculated 06/04/2024 30          Radiology Results:   No results found.    Yelena Melendez PA-C     **Please note: Dictation voice to text software may have been used in the creation of this record. Occasional wrong word or “sound alike” substitutions may have occurred due to the inherent limitations of voice recognition software. Read the chart carefully and recognize, using context, where substitutions have occurred.**

## 2024-06-26 ENCOUNTER — OFFICE VISIT (OUTPATIENT)
Dept: INTERNAL MEDICINE CLINIC | Facility: CLINIC | Age: 39
End: 2024-06-26
Payer: COMMERCIAL

## 2024-06-26 VITALS
HEART RATE: 73 BPM | OXYGEN SATURATION: 96 % | WEIGHT: 240.2 LBS | SYSTOLIC BLOOD PRESSURE: 108 MMHG | DIASTOLIC BLOOD PRESSURE: 76 MMHG | HEIGHT: 73 IN | BODY MASS INDEX: 31.83 KG/M2 | RESPIRATION RATE: 16 BRPM

## 2024-06-26 DIAGNOSIS — E78.2 MIXED HYPERLIPIDEMIA: Primary | ICD-10-CM

## 2024-06-26 DIAGNOSIS — K76.9 LIVER LESION: ICD-10-CM

## 2024-06-26 DIAGNOSIS — F10.10 ALCOHOL ABUSE: ICD-10-CM

## 2024-06-26 DIAGNOSIS — I85.00 ESOPHAGEAL VARICES WITHOUT BLEEDING, UNSPECIFIED ESOPHAGEAL VARICES TYPE (HCC): ICD-10-CM

## 2024-06-26 DIAGNOSIS — K70.11 ALCOHOLIC HEPATITIS WITH ASCITES: ICD-10-CM

## 2024-06-26 DIAGNOSIS — Z86.73 HISTORY OF CEREBELLAR STROKE: ICD-10-CM

## 2024-06-26 DIAGNOSIS — F32.A DEPRESSION, UNSPECIFIED DEPRESSION TYPE: ICD-10-CM

## 2024-06-26 DIAGNOSIS — F10.20 ALCOHOLISM (HCC): ICD-10-CM

## 2024-06-26 DIAGNOSIS — F51.04 PSYCHOPHYSIOLOGICAL INSOMNIA: ICD-10-CM

## 2024-06-26 DIAGNOSIS — K70.30 ALCOHOLIC CIRRHOSIS OF LIVER WITHOUT ASCITES (HCC): ICD-10-CM

## 2024-06-26 DIAGNOSIS — R73.01 IMPAIRED FASTING GLUCOSE: ICD-10-CM

## 2024-06-26 PROBLEM — R53.1 LEFT-SIDED WEAKNESS: Status: ACTIVE | Noted: 2018-07-10

## 2024-06-26 PROCEDURE — 99214 OFFICE O/P EST MOD 30 MIN: CPT | Performed by: INTERNAL MEDICINE

## 2024-06-26 RX ORDER — TRAZODONE HYDROCHLORIDE 50 MG/1
50 TABLET ORAL
Qty: 90 TABLET | Refills: 3 | Status: SHIPPED | OUTPATIENT
Start: 2024-06-26

## 2024-06-26 NOTE — ASSESSMENT & PLAN NOTE
- EGD (3/2023) with grade 2 esophageal varices  - Continue carvedilol 6.25 mg BID for primary ppx  - No further EGDs necessary for variceal screening barring a decompensating event.

## 2024-06-26 NOTE — ASSESSMENT & PLAN NOTE
- MRI (4/2024) with a 1.6 x 1.1 LR3 lesion. AFP normal.   - Scheduled for a repeat MR abdomen (hepatoma protocol) in October 2024.   - continue GI f/u.  - Continue imaging and AFP levels q6 months.

## 2024-06-26 NOTE — PROGRESS NOTES
Ambulatory Visit  Name: Wenceslao Burrell      : 1985      MRN: [id number]  Encounter Provider: Faye Lebron MD  Encounter Date: 2024   Encounter department: [facility] INTERNAL MEDICINE [place]    Assessment & Plan     1. Mixed hyperlipidemia  Assessment & Plan:  Continue atorvastatin  Denies side effects  Orders:  -     CBC and differential; Future  -     Basic metabolic panel; Future  -     TSH, 3rd generation with Free T4 reflex; Future  -     Lipid Panel with Direct LDL reflex; Future  2. Psychophysiological insomnia  -     traZODone (DESYREL) 50 mg tablet; Take 1 tablet (50 mg total) by mouth daily at bedtime  3. Alcohol abuse  4. Depression, unspecified depression type  Assessment & Plan:  Continue Lexapro and trazodone for sleep.  5. Impaired fasting glucose  -     Hemoglobin A1C; Future  6. Alcoholic hepatitis with ascites  7. Alcoholism (HCC)  Assessment & Plan:  H/o alcoholism and continues his sobriety.  Can stop thiamine and folic acid.  8. Alcoholic cirrhosis of liver without ascites (HCC)  9. Esophageal varices without bleeding, unspecified esophageal varices type (HCC)  Assessment & Plan:  - EGD (3/2023) with grade 2 esophageal varices  - Continue carvedilol 6.25 mg BID for primary ppx  - No further EGDs necessary for variceal screening barring a decompensating event.   10. Liver lesion  Assessment & Plan:  - MRI (2024) with a 1.6 x 1.1 LR3 lesion. AFP normal.   - Scheduled for a repeat MR abdomen (hepatoma protocol) in 2024.   - continue GI f/u.  - Continue imaging and AFP levels q6 months.  11. History of cerebellar stroke  Assessment & Plan:  4-5 years ago  Some residual weakness to left side       History of Present Illness     Patient is here for routine follow up, reviewed chronic medical problems, ordered labs for next visit including CBC CMP TSH A1C LIPID. Reviewed labs for this visit.        Review of Systems   Constitutional:  Negative for chills and fever.    HENT:  Negative for ear pain and sore throat.    Eyes:  Negative for pain and visual disturbance.   Respiratory:  Negative for cough and shortness of breath.    Cardiovascular:  Negative for chest pain and palpitations.   Gastrointestinal:  Negative for abdominal pain and vomiting.   Genitourinary:  Negative for dysuria and hematuria.   Musculoskeletal:  Negative for arthralgias and back pain.   Skin:  Negative for color change and rash.   Neurological:  Negative for seizures and syncope.   All other systems reviewed and are negative.    Past Medical History   Past Medical History:   Diagnosis Date    Anxiety     Depression     Hyperlipidemia     Hypertension     Stroke (HCC)     Substance abuse (HCC)      Past Surgical History:   Procedure Laterality Date    IR PARACENTESIS  3/3/2023    IR PARACENTESIS  3/6/2023    IR PARACENTESIS  3/17/2023    IR PARACENTESIS  4/10/2023     Family History   Problem Relation Age of Onset    Lymphoma Mother     Heart attack Mother     Hypertension Mother     Heart disease Mother     Cancer Mother     Hypertension Father      Current Outpatient Medications on File Prior to Visit   Medication Sig Dispense Refill    atorvastatin (LIPITOR) 40 mg tablet Take 1 tablet (40 mg total) by mouth daily 90 tablet 1    carvedilol (COREG) 3.125 mg tablet TAKE 2 TABLETS BY MOUTH TWICE DAILY WITH MEALS 360 tablet 0    escitalopram (LEXAPRO) 10 mg tablet Take 1 tablet (10 mg total) by mouth daily 90 tablet 0    [DISCONTINUED] folic acid (FOLVITE) 1 mg tablet Take 1 tablet (1 mg total) by mouth daily 30 tablet 5    [DISCONTINUED] thiamine 100 MG tablet Take 1 tablet (100 mg total) by mouth daily 30 tablet 5    [DISCONTINUED] traZODone (DESYREL) 50 mg tablet Take 1 tablet (50 mg total) by mouth daily at bedtime 90 tablet 0     No current facility-administered medications on file prior to visit.   No Known Allergies   Objective     /76 (BP Location: Left arm, Patient Position: Sitting, Cuff  "Size: Adult)   Pulse 73   Resp 16   Ht 6' 1\" (1.854 m)   Wt 109 kg (240 lb 3.2 oz)   SpO2 96%   BMI 31.69 kg/m²     Physical Exam  Vitals and nursing note reviewed.   Constitutional:       General: He is not in acute distress.     Appearance: He is well-developed.   HENT:      Head: Normocephalic and atraumatic.   Eyes:      Conjunctiva/sclera: Conjunctivae normal.   Cardiovascular:      Rate and Rhythm: Normal rate and regular rhythm.      Heart sounds: No murmur heard.  Pulmonary:      Effort: Pulmonary effort is normal. No respiratory distress.      Breath sounds: Normal breath sounds.   Abdominal:      Palpations: Abdomen is soft.      Tenderness: There is no abdominal tenderness.   Musculoskeletal:         General: No swelling.      Cervical back: Neck supple.   Skin:     General: Skin is warm and dry.      Capillary Refill: Capillary refill takes less than 2 seconds.   Neurological:      Mental Status: He is alert.   Psychiatric:         Mood and Affect: Mood normal.       Administrative Statements   I have spent a total time of 15 minutes on 06/26/24 In caring for this patient including Patient and family education, Importance of tx compliance, Impressions, Counseling / Coordination of care, Reviewing / ordering tests, medicine, procedures  , and Obtaining or reviewing history  .        "

## 2024-07-01 DIAGNOSIS — F32.A DEPRESSION, UNSPECIFIED DEPRESSION TYPE: ICD-10-CM

## 2024-07-02 RX ORDER — ESCITALOPRAM OXALATE 10 MG/1
10 TABLET ORAL DAILY
Qty: 90 TABLET | Refills: 1 | Status: SHIPPED | OUTPATIENT
Start: 2024-07-02 | End: 2024-12-29

## 2024-07-12 ENCOUNTER — VBI (OUTPATIENT)
Dept: ADMINISTRATIVE | Facility: OTHER | Age: 39
End: 2024-07-12

## 2024-07-17 DIAGNOSIS — K72.90 DECOMPENSATED HEPATIC CIRRHOSIS (HCC): ICD-10-CM

## 2024-07-17 DIAGNOSIS — K74.60 DECOMPENSATED HEPATIC CIRRHOSIS (HCC): ICD-10-CM

## 2024-07-17 DIAGNOSIS — I85.00 ESOPHAGEAL VARICES WITHOUT BLEEDING, UNSPECIFIED ESOPHAGEAL VARICES TYPE (HCC): ICD-10-CM

## 2024-07-17 RX ORDER — CARVEDILOL 3.12 MG/1
6.25 TABLET ORAL 2 TIMES DAILY WITH MEALS
Qty: 360 TABLET | Refills: 1 | Status: SHIPPED | OUTPATIENT
Start: 2024-07-17

## 2024-09-18 ENCOUNTER — APPOINTMENT (OUTPATIENT)
Dept: LAB | Facility: HOSPITAL | Age: 39
End: 2024-09-18
Payer: COMMERCIAL

## 2024-09-18 DIAGNOSIS — E78.2 MIXED HYPERLIPIDEMIA: ICD-10-CM

## 2024-09-18 DIAGNOSIS — R73.01 IMPAIRED FASTING GLUCOSE: ICD-10-CM

## 2024-09-18 LAB
ANION GAP SERPL CALCULATED.3IONS-SCNC: 5 MMOL/L (ref 4–13)
BASOPHILS # BLD AUTO: 0.04 THOUSANDS/ΜL (ref 0–0.1)
BASOPHILS NFR BLD AUTO: 1 % (ref 0–1)
BUN SERPL-MCNC: 18 MG/DL (ref 5–25)
CALCIUM SERPL-MCNC: 9.3 MG/DL (ref 8.4–10.2)
CHLORIDE SERPL-SCNC: 105 MMOL/L (ref 96–108)
CHOLEST SERPL-MCNC: 86 MG/DL
CO2 SERPL-SCNC: 29 MMOL/L (ref 21–32)
CREAT SERPL-MCNC: 0.97 MG/DL (ref 0.6–1.3)
EOSINOPHIL # BLD AUTO: 0.03 THOUSAND/ΜL (ref 0–0.61)
EOSINOPHIL NFR BLD AUTO: 1 % (ref 0–6)
ERYTHROCYTE [DISTWIDTH] IN BLOOD BY AUTOMATED COUNT: 12.1 % (ref 11.6–15.1)
EST. AVERAGE GLUCOSE BLD GHB EST-MCNC: 94 MG/DL
GFR SERPL CREATININE-BSD FRML MDRD: 97 ML/MIN/1.73SQ M
GLUCOSE P FAST SERPL-MCNC: 110 MG/DL (ref 65–99)
HBA1C MFR BLD: 4.9 %
HCT VFR BLD AUTO: 41.8 % (ref 36.5–49.3)
HDLC SERPL-MCNC: 44 MG/DL
HGB BLD-MCNC: 14.4 G/DL (ref 12–17)
IMM GRANULOCYTES # BLD AUTO: 0.01 THOUSAND/UL (ref 0–0.2)
IMM GRANULOCYTES NFR BLD AUTO: 0 % (ref 0–2)
LDLC SERPL CALC-MCNC: 34 MG/DL (ref 0–100)
LYMPHOCYTES # BLD AUTO: 1.68 THOUSANDS/ΜL (ref 0.6–4.47)
LYMPHOCYTES NFR BLD AUTO: 36 % (ref 14–44)
MCH RBC QN AUTO: 30.4 PG (ref 26.8–34.3)
MCHC RBC AUTO-ENTMCNC: 34.4 G/DL (ref 31.4–37.4)
MCV RBC AUTO: 88 FL (ref 82–98)
MONOCYTES # BLD AUTO: 0.36 THOUSAND/ΜL (ref 0.17–1.22)
MONOCYTES NFR BLD AUTO: 8 % (ref 4–12)
NEUTROPHILS # BLD AUTO: 2.58 THOUSANDS/ΜL (ref 1.85–7.62)
NEUTS SEG NFR BLD AUTO: 54 % (ref 43–75)
NRBC BLD AUTO-RTO: 0 /100 WBCS
PLATELET # BLD AUTO: 144 THOUSANDS/UL (ref 149–390)
PMV BLD AUTO: 10.8 FL (ref 8.9–12.7)
POTASSIUM SERPL-SCNC: 4.3 MMOL/L (ref 3.5–5.3)
RBC # BLD AUTO: 4.73 MILLION/UL (ref 3.88–5.62)
SODIUM SERPL-SCNC: 139 MMOL/L (ref 135–147)
TRIGL SERPL-MCNC: 41 MG/DL
TSH SERPL DL<=0.05 MIU/L-ACNC: 0.84 UIU/ML (ref 0.45–4.5)
WBC # BLD AUTO: 4.7 THOUSAND/UL (ref 4.31–10.16)

## 2024-09-18 PROCEDURE — 36415 COLL VENOUS BLD VENIPUNCTURE: CPT

## 2024-09-18 PROCEDURE — 85025 COMPLETE CBC W/AUTO DIFF WBC: CPT

## 2024-09-18 PROCEDURE — 80048 BASIC METABOLIC PNL TOTAL CA: CPT

## 2024-09-18 PROCEDURE — 83036 HEMOGLOBIN GLYCOSYLATED A1C: CPT

## 2024-09-18 PROCEDURE — 80061 LIPID PANEL: CPT

## 2024-09-18 PROCEDURE — 84443 ASSAY THYROID STIM HORMONE: CPT

## 2024-10-04 ENCOUNTER — HOSPITAL ENCOUNTER (OUTPATIENT)
Dept: MRI IMAGING | Facility: HOSPITAL | Age: 39
End: 2024-10-04
Payer: COMMERCIAL

## 2024-10-04 DIAGNOSIS — K72.90 DECOMPENSATED HEPATIC CIRRHOSIS (HCC): ICD-10-CM

## 2024-10-04 DIAGNOSIS — K74.60 DECOMPENSATED HEPATIC CIRRHOSIS (HCC): ICD-10-CM

## 2024-10-04 PROCEDURE — 74183 MRI ABD W/O CNTR FLWD CNTR: CPT

## 2024-10-04 PROCEDURE — A9585 GADOBUTROL INJECTION: HCPCS | Performed by: PHYSICIAN ASSISTANT

## 2024-10-04 RX ORDER — GADOBUTROL 604.72 MG/ML
10 INJECTION INTRAVENOUS
Status: COMPLETED | OUTPATIENT
Start: 2024-10-04 | End: 2024-10-04

## 2024-10-04 RX ADMIN — GADOBUTROL 10 ML: 604.72 INJECTION INTRAVENOUS at 17:03

## 2024-10-09 ENCOUNTER — TELEPHONE (OUTPATIENT)
Dept: GASTROENTEROLOGY | Facility: CLINIC | Age: 39
End: 2024-10-09

## 2024-10-09 NOTE — TELEPHONE ENCOUNTER
----- Message from Virgie Davey PA-C sent at 10/9/2024  3:23 PM EDT -----  Porter Billy your MRI shows evidence of cirrhosis but no lesions noted in the liver.  You will need to have a repeat MRI/MRCP in 6 months.  I also think we should schedule you for a follow-up office visit in December of this year.

## 2024-10-21 DIAGNOSIS — E78.2 MIXED HYPERLIPIDEMIA: ICD-10-CM

## 2024-10-22 RX ORDER — ATORVASTATIN CALCIUM 40 MG/1
40 TABLET, FILM COATED ORAL DAILY
Qty: 90 TABLET | Refills: 1 | Status: SHIPPED | OUTPATIENT
Start: 2024-10-22

## 2024-12-06 ENCOUNTER — TELEPHONE (OUTPATIENT)
Dept: GASTROENTEROLOGY | Facility: CLINIC | Age: 39
End: 2024-12-06

## 2024-12-19 NOTE — ASSESSMENT & PLAN NOTE
H/o alcoholism and continues his sobriety.      Orders:    CBC and differential; Future    Comprehensive metabolic panel; Future

## 2024-12-19 NOTE — ASSESSMENT & PLAN NOTE
Continue Lexapro and trazodone for sleep.    Orders:    CBC and differential; Future    Comprehensive metabolic panel; Future

## 2024-12-19 NOTE — ASSESSMENT & PLAN NOTE
- EGD (3/2023) with grade 2 esophageal varices  - Continue carvedilol 6.25 mg BID for primary ppx  - No further EGDs necessary for variceal screening barring a decompensating event.     Orders:    CBC and differential; Future    Comprehensive metabolic panel; Future

## 2024-12-19 NOTE — ASSESSMENT & PLAN NOTE
Continue atorvastatin. Denies side effects    Orders:    CBC and differential; Future    Comprehensive metabolic panel; Future    Lipid Panel with Direct LDL reflex; Future

## 2024-12-20 ENCOUNTER — OFFICE VISIT (OUTPATIENT)
Dept: INTERNAL MEDICINE CLINIC | Facility: CLINIC | Age: 39
End: 2024-12-20
Payer: COMMERCIAL

## 2024-12-20 VITALS
BODY MASS INDEX: 31.41 KG/M2 | WEIGHT: 237 LBS | SYSTOLIC BLOOD PRESSURE: 118 MMHG | RESPIRATION RATE: 18 BRPM | TEMPERATURE: 97.8 F | OXYGEN SATURATION: 98 % | DIASTOLIC BLOOD PRESSURE: 88 MMHG | HEART RATE: 70 BPM | HEIGHT: 73 IN

## 2024-12-20 DIAGNOSIS — R73.01 IMPAIRED FASTING GLUCOSE: ICD-10-CM

## 2024-12-20 DIAGNOSIS — F32.A DEPRESSION, UNSPECIFIED DEPRESSION TYPE: ICD-10-CM

## 2024-12-20 DIAGNOSIS — I85.00 ESOPHAGEAL VARICES WITHOUT BLEEDING, UNSPECIFIED ESOPHAGEAL VARICES TYPE (HCC): Primary | ICD-10-CM

## 2024-12-20 DIAGNOSIS — Z86.73 HISTORY OF CEREBELLAR STROKE: ICD-10-CM

## 2024-12-20 DIAGNOSIS — E78.2 MIXED HYPERLIPIDEMIA: ICD-10-CM

## 2024-12-20 DIAGNOSIS — F10.20 ALCOHOLISM (HCC): ICD-10-CM

## 2024-12-20 PROBLEM — I10 ESSENTIAL HYPERTENSION: Status: ACTIVE | Noted: 2017-06-21

## 2024-12-20 PROCEDURE — 99214 OFFICE O/P EST MOD 30 MIN: CPT | Performed by: INTERNAL MEDICINE

## 2024-12-20 NOTE — PROGRESS NOTES
Name: Wenceslao Burrell      : 1985      MRN: [id number]  Encounter Provider: Faye Lebron MD  Encounter Date: 2024   Encounter department: [facility] INTERNAL MEDICINE [place]  :  Assessment & Plan  Esophageal varices without bleeding, unspecified esophageal varices type (HCC)  - EGD (3/2023) with grade 2 esophageal varices  - Continue carvedilol 6.25 mg BID for primary ppx  - No further EGDs necessary for variceal screening barring a decompensating event.     Orders:    CBC and differential; Future    Comprehensive metabolic panel; Future    Alcoholism (HCC)  H/o alcoholism and continues his sobriety.      Orders:    CBC and differential; Future    Comprehensive metabolic panel; Future    Depression, unspecified depression type  Continue Lexapro and trazodone for sleep.    Orders:    CBC and differential; Future    Comprehensive metabolic panel; Future    History of cerebellar stroke  4-5 years ago, Some residual weakness to left side         Mixed hyperlipidemia  Continue atorvastatin. Denies side effects    Orders:    CBC and differential; Future    Comprehensive metabolic panel; Future    Lipid Panel with Direct LDL reflex; Future    Impaired fasting glucose    Orders:    Hemoglobin A1C; Future          Depression Screening and Follow-up Plan: Patient's depression screening was positive with a PHQ-9 score of 9. Patient with underlying depression and was advised to continue current medications as prescribed.     History of Present Illness     Patient is here for routine follow up, reviewed chronic medical problems, ordered labs for next visit including CBC CMP TSH A1C LIPID. Reviewed labs for this visit.      Review of Systems   Constitutional:  Negative for chills and fever.   HENT:  Negative for ear pain and sore throat.    Eyes:  Negative for pain and visual disturbance.   Respiratory:  Negative for cough and shortness of breath.    Cardiovascular:  Negative for chest pain and  "palpitations.   Gastrointestinal:  Negative for abdominal pain and vomiting.   Genitourinary:  Negative for dysuria and hematuria.   Musculoskeletal:  Negative for arthralgias and back pain.   Skin:  Negative for color change and rash.   Neurological:  Negative for seizures and syncope.   All other systems reviewed and are negative.      Objective   /88 (BP Location: Left arm, Patient Position: Sitting, Cuff Size: Standard)   Pulse 70   Temp 97.8 °F (36.6 °C) (Tympanic)   Resp 18   Ht 6' 1\" (1.854 m)   Wt 108 kg (237 lb)   SpO2 98%   BMI 31.27 kg/m²      Physical Exam  Vitals and nursing note reviewed.   Constitutional:       General: He is not in acute distress.     Appearance: He is well-developed.   HENT:      Head: Normocephalic and atraumatic.   Eyes:      Conjunctiva/sclera: Conjunctivae normal.   Cardiovascular:      Rate and Rhythm: Normal rate and regular rhythm.      Heart sounds: No murmur heard.  Pulmonary:      Effort: Pulmonary effort is normal. No respiratory distress.      Breath sounds: Normal breath sounds.   Abdominal:      Palpations: Abdomen is soft.      Tenderness: There is no abdominal tenderness.   Musculoskeletal:         General: No swelling.      Cervical back: Neck supple.   Skin:     General: Skin is warm and dry.      Capillary Refill: Capillary refill takes less than 2 seconds.   Neurological:      Mental Status: He is alert.   Psychiatric:         Mood and Affect: Mood normal.       "

## 2024-12-30 DIAGNOSIS — I85.00 ESOPHAGEAL VARICES WITHOUT BLEEDING, UNSPECIFIED ESOPHAGEAL VARICES TYPE (HCC): ICD-10-CM

## 2024-12-30 DIAGNOSIS — K72.90 DECOMPENSATED HEPATIC CIRRHOSIS (HCC): ICD-10-CM

## 2024-12-30 DIAGNOSIS — F32.A DEPRESSION, UNSPECIFIED DEPRESSION TYPE: ICD-10-CM

## 2024-12-30 DIAGNOSIS — K74.60 DECOMPENSATED HEPATIC CIRRHOSIS (HCC): ICD-10-CM

## 2024-12-30 RX ORDER — ESCITALOPRAM OXALATE 10 MG/1
10 TABLET ORAL DAILY
Qty: 90 TABLET | Refills: 1 | Status: SHIPPED | OUTPATIENT
Start: 2024-12-30 | End: 2025-06-28

## 2024-12-31 RX ORDER — CARVEDILOL 3.12 MG/1
6.25 TABLET ORAL 2 TIMES DAILY WITH MEALS
Qty: 360 TABLET | Refills: 1 | Status: SHIPPED | OUTPATIENT
Start: 2024-12-31

## 2025-01-09 ENCOUNTER — APPOINTMENT (OUTPATIENT)
Dept: LAB | Facility: HOSPITAL | Age: 40
End: 2025-01-09
Payer: COMMERCIAL

## 2025-01-09 DIAGNOSIS — K70.30 ALCOHOLIC CIRRHOSIS OF LIVER WITHOUT ASCITES (HCC): ICD-10-CM

## 2025-01-09 LAB
AFP-TM SERPL-MCNC: 1.64 NG/ML (ref 0–9)
ALBUMIN SERPL BCG-MCNC: 4.8 G/DL (ref 3.5–5)
ALP SERPL-CCNC: 63 U/L (ref 34–104)
ALT SERPL W P-5'-P-CCNC: 56 U/L (ref 7–52)
ANION GAP SERPL CALCULATED.3IONS-SCNC: 5 MMOL/L (ref 4–13)
AST SERPL W P-5'-P-CCNC: 51 U/L (ref 13–39)
BASOPHILS # BLD AUTO: 0.06 THOUSANDS/ΜL (ref 0–0.1)
BASOPHILS NFR BLD AUTO: 1 % (ref 0–1)
BILIRUB SERPL-MCNC: 0.76 MG/DL (ref 0.2–1)
BUN SERPL-MCNC: 15 MG/DL (ref 5–25)
CALCIUM SERPL-MCNC: 9.7 MG/DL (ref 8.4–10.2)
CHLORIDE SERPL-SCNC: 104 MMOL/L (ref 96–108)
CO2 SERPL-SCNC: 31 MMOL/L (ref 21–32)
CREAT SERPL-MCNC: 0.83 MG/DL (ref 0.6–1.3)
EOSINOPHIL # BLD AUTO: 0.06 THOUSAND/ΜL (ref 0–0.61)
EOSINOPHIL NFR BLD AUTO: 1 % (ref 0–6)
ERYTHROCYTE [DISTWIDTH] IN BLOOD BY AUTOMATED COUNT: 12.3 % (ref 11.6–15.1)
GFR SERPL CREATININE-BSD FRML MDRD: 110 ML/MIN/1.73SQ M
GLUCOSE P FAST SERPL-MCNC: 120 MG/DL (ref 65–99)
HCT VFR BLD AUTO: 45.2 % (ref 36.5–49.3)
HGB BLD-MCNC: 15.4 G/DL (ref 12–17)
IMM GRANULOCYTES # BLD AUTO: 0.01 THOUSAND/UL (ref 0–0.2)
IMM GRANULOCYTES NFR BLD AUTO: 0 % (ref 0–2)
INR PPP: 0.98 (ref 0.85–1.19)
LYMPHOCYTES # BLD AUTO: 1.71 THOUSANDS/ΜL (ref 0.6–4.47)
LYMPHOCYTES NFR BLD AUTO: 37 % (ref 14–44)
MCH RBC QN AUTO: 30.3 PG (ref 26.8–34.3)
MCHC RBC AUTO-ENTMCNC: 34.1 G/DL (ref 31.4–37.4)
MCV RBC AUTO: 89 FL (ref 82–98)
MONOCYTES # BLD AUTO: 0.29 THOUSAND/ΜL (ref 0.17–1.22)
MONOCYTES NFR BLD AUTO: 6 % (ref 4–12)
NEUTROPHILS # BLD AUTO: 2.54 THOUSANDS/ΜL (ref 1.85–7.62)
NEUTS SEG NFR BLD AUTO: 55 % (ref 43–75)
NRBC BLD AUTO-RTO: 0 /100 WBCS
PLATELET # BLD AUTO: 155 THOUSANDS/UL (ref 149–390)
PMV BLD AUTO: 11 FL (ref 8.9–12.7)
POTASSIUM SERPL-SCNC: 4.1 MMOL/L (ref 3.5–5.3)
PROT SERPL-MCNC: 7.4 G/DL (ref 6.4–8.4)
PROTHROMBIN TIME: 13.7 SECONDS (ref 12.3–15)
RBC # BLD AUTO: 5.08 MILLION/UL (ref 3.88–5.62)
SODIUM SERPL-SCNC: 140 MMOL/L (ref 135–147)
WBC # BLD AUTO: 4.67 THOUSAND/UL (ref 4.31–10.16)

## 2025-01-09 PROCEDURE — 80053 COMPREHEN METABOLIC PANEL: CPT

## 2025-01-09 PROCEDURE — 85025 COMPLETE CBC W/AUTO DIFF WBC: CPT

## 2025-01-09 PROCEDURE — 85610 PROTHROMBIN TIME: CPT

## 2025-01-09 PROCEDURE — 82105 ALPHA-FETOPROTEIN SERUM: CPT

## 2025-01-09 PROCEDURE — 36415 COLL VENOUS BLD VENIPUNCTURE: CPT

## 2025-01-13 ENCOUNTER — OFFICE VISIT (OUTPATIENT)
Dept: GASTROENTEROLOGY | Facility: CLINIC | Age: 40
End: 2025-01-13
Payer: COMMERCIAL

## 2025-01-13 VITALS
HEIGHT: 73 IN | DIASTOLIC BLOOD PRESSURE: 82 MMHG | SYSTOLIC BLOOD PRESSURE: 114 MMHG | OXYGEN SATURATION: 98 % | TEMPERATURE: 97 F | WEIGHT: 237 LBS | BODY MASS INDEX: 31.41 KG/M2 | HEART RATE: 79 BPM

## 2025-01-13 DIAGNOSIS — K70.30 ALCOHOLIC CIRRHOSIS OF LIVER WITHOUT ASCITES (HCC): Primary | ICD-10-CM

## 2025-01-13 DIAGNOSIS — I85.00 ESOPHAGEAL VARICES WITHOUT BLEEDING, UNSPECIFIED ESOPHAGEAL VARICES TYPE (HCC): ICD-10-CM

## 2025-01-13 DIAGNOSIS — K76.6 PORTAL HYPERTENSION (HCC): ICD-10-CM

## 2025-01-13 PROCEDURE — 99214 OFFICE O/P EST MOD 30 MIN: CPT | Performed by: STUDENT IN AN ORGANIZED HEALTH CARE EDUCATION/TRAINING PROGRAM

## 2025-01-13 RX ORDER — CARVEDILOL 6.25 MG/1
6.25 TABLET ORAL 2 TIMES DAILY WITH MEALS
Qty: 180 TABLET | Refills: 3 | Status: SHIPPED | OUTPATIENT
Start: 2025-01-13 | End: 2026-01-13

## 2025-01-13 NOTE — ASSESSMENT & PLAN NOTE
40 y.o. male with history of HLD, HTN, CVA and EtOH hepatitis with EtOH cirrhosis. He has no acute liver specific complaints or clinical evidence of hepatic decompensation.      He was admitted to  in March 2023 for cholestatic liver injury with AST predominance and liver synthetic dysfunction on outpatient labs for work-up of abdominal pain. He reported history of heavy EtOH consumption (two 40 oz daily) and was noted to have severe EtOH hepatitis with MELD-Na 26 and DF 38. He had an MRI abdomen which showed cirrhotic liver morphology with intraabdominal varices and large amount of ascites. He had an LVP which confirmed portal hypertensive ascites that was negative for SBP.  He had an EGD which showed grade 2 esophageal varices and was started on NSBB. He completed prednisolone with improvement of his liver chemistries.     He has been abstinent from his diagnosis and has an updated MELD (3.0) 6 with mildly elevated transaminases and normal live liver synthetic function. He had an US elastography which showed F4 disease despite prolonged abstinence which supports the diagnosis of cirrhosis but has clinically recompensated. He is undergoing MRI surveillance for follow up of liver lesion that was not visualized on his most recent study in October 2024.     He is otherwise up-to-date with his routine cirrhosis health maintenance and will return to clinic in 6 months or sooner if needed. Discussed that if he remains clinically stable, can lengthen his follow up interval to 1 year. Thank you for the opportunity to consult in his care.     1. Decompensated cirrhosis: MELD (3.0) 6  - Etiology: EtOH in sustained remission      2. Ascites, euvolemic   - Consider starting diuretics if he develops recurrent ascites needing drainage   - He will continue a low sodium diet (< 2g per day) to help prevent fluid retention     3. Varices  - Last EGD showed grade 2 varices in 3/2023  - Continue carvedilol 6.25 mg BID for primary  prophyaxis   - No further EGDs needed for screening      4. HCC  - No lesions on MRI in October 2024     5 Healthcare maintenance for patients with cirrhosis  -He was instructed to take no more than 2 grams of tylenol in 24 hours and no products containing NSAIDs, benzodiazapines, and narcotics.  -He was also instructed to avoid raw shellfish   -He should participate in daily exercise as to prevent loss of muscle mass  -He should abstain from all alcohol intake and was counseled on this.    -He is at risk for vitamin deficiencies and metabolic bone disease.   -Needs HBV vaccination   -Additionally, he should receive a yearly flu shot and the pneumonia vaccine through his primary care provider.     Orders:    MRI abdomen w wo contrast and mrcp; Future    CBC and Platelet; Future    Comprehensive metabolic panel; Future    Protime-INR; Future    AFP tumor marker; Future

## 2025-01-13 NOTE — PROGRESS NOTES
[facility] - Outpatient Consultation  Wenceslao Burrell 40 y.o. male MRN: [id number]  Encounter: [id number]    PCP:  Faye Lebron MD, [phone]  Referring Provider:  No ref. provider found,     Patient: Wenceslao Burrell, 1985  Reason for Referral: EtOH-related cirrhosis     ASSESSMENT/PLAN:  40 y.o. male with history of HLD, HTN, CVA and EtOH hepatitis with EtOH cirrhosis. He has no acute liver specific complaints or clinical evidence of hepatic decompensation.      He was admitted to  in March 2023 for cholestatic liver injury with AST predominance and liver synthetic dysfunction on outpatient labs for work-up of abdominal pain. He reported history of heavy EtOH consumption (two 40 oz daily) and was noted to have severe EtOH hepatitis with MELD-Na 26 and DF 38. He had an MRI abdomen which showed cirrhotic liver morphology with intraabdominal varices and large amount of ascites. He had an LVP which confirmed portal hypertensive ascites that was negative for SBP.  He had an EGD which showed grade 2 esophageal varices and was started on NSBB. He completed prednisolone with improvement of his liver chemistries.     He has been abstinent from his diagnosis and has an updated MELD (3.0) 8 with mildly elevated transaminases and mild liver synthetic dysfunction. He had an US elastography which showed F4 disease despite prolonged abstinence which supports the diagnosis of cirrhosis but has clinically recompensated.      He is otherwise up-to-date with his routine cirrhosis health maintenance and will return to clinic in 6 months or sooner if needed. Thank you for the opportunity to consult in his care.     1. Decompensated cirrhosis: MELD (3.0) 6  - Etiology: EtOH in sustained remission     2. Ascites, euvolemic   - Consider starting diuretics if he develops recurrent ascites needing drainage   - He will continue a low sodium diet (< 2g per day) to help prevent fluid retention     3. Varices  -  Last EGD showed grade 2 varices in 3/2023  - On carvedilol 6.25 mg BID for primary prophyaxis   - No further EGDs needed for screening      4. HCC  - No lesions on MRI in October 2024     5 Healthcare maintenance for patients with cirrhosis  -He was instructed to take no more than 2 grams of tylenol in 24 hours and no products containing NSAIDs, benzodiazapines, and narcotics.  -He was also instructed to avoid raw shellfish   -He should participate in daily exercise as to prevent loss of muscle mass  -He should abstain from all alcohol intake and was counseled on this.    -He is at risk for vitamin deficiencies and metabolic bone disease.   -Needs HBV vaccination   -Additionally, he should receive a yearly flu shot and the pneumonia vaccine through his primary care provider.     Adriana Shelby MD  Division of Gastroenterology and Hepatology  [facility]      RTC in     Adriana Shelby MD  Division of Gastroenterology and Hepatology  [facility]    ============================================================================  CC/HPI: 40 y.o. male with history of HLD, HTN, CVA and EtOH hepatitis who presents for follow up. He was last seen in June 2024.     Interval events   - Had US elastography which showed F4 disease  - Last MELD 8      Extended liver history  He was admitted to  in early March 2023 for abnormal liver chemistries on outpatient labs for work-up of abdominal pain. He reported history of heavy EtOH consumption (two 40 oz daily x 10 years) and was noted to have severe EtOH hepatitis with MELD-Na 26 and DF 38. He had an MRI abdomen which showed cirrhotic liver morphology with intraabdominal varices and large amount of ascites. He was started on prednisolone and completed 28d course with improvement of his liver chemistries and updated MELD-Na 13. He has required intermittent LVPs with fluid studies consistent with portal hypertension and negative for SBP. He  was not started on diuretics but did last require and LVP in April 2023 with removal of 4.2L. He had an EGD which showed grade 2 esophageal varices and was started on nadolol.      Since his diagnosis, he has been abstinent from EtOH. He states that he did not know of his liver disease prior to his hospitalization. He denies a prior history of DUI or work-related issues due to EtOH. He denies recreational drug use. He lives with his wife and has no children. He states that there is no EtOH in his home.     US elastography in November 2023 showed F4 disease.     ROS: Complete review of systems otherwise negative.     PAST MEDICAL/SURGICAL HISTORY:  Past Medical History:   Diagnosis Date    Alcoholism (HCC)     Anxiety     Depression     Fatty liver     Hyperlipidemia     Hypertension     Liver disease     Stroke (HCC)     Substance abuse (HCC)         Past Surgical History:   Procedure Laterality Date    IR PARACENTESIS  3/3/2023    IR PARACENTESIS  3/6/2023    IR PARACENTESIS  3/17/2023    IR PARACENTESIS  4/10/2023       FAMILY/SOCIAL HISTORY:  Family History   Problem Relation Age of Onset    Lymphoma Mother     Heart attack Mother     Hypertension Mother     Heart disease Mother     Cancer Mother     Hypertension Father        Social History     Tobacco Use    Smoking status: Passive Smoke Exposure - Never Smoker     Passive exposure: Yes    Smokeless tobacco: Current     Types: Snuff    Tobacco comments:     Patient states he is ready for tobacco cessation   Vaping Use    Vaping status: Never Used   Substance Use Topics    Alcohol use: Not Currently     Alcohol/week: 6.0 standard drinks of alcohol     Types: 6 Cans of beer per week    Drug use: Never       MEDICATIONS:  Current Outpatient Medications on File Prior to Visit   Medication Sig Dispense Refill    atorvastatin (LIPITOR) 40 mg tablet Take 1 tablet (40 mg total) by mouth daily 90 tablet 1    carvedilol (COREG) 3.125 mg tablet Take 2 tablets (6.25 mg  "total) by mouth 2 (two) times a day with meals 360 tablet 1    escitalopram (LEXAPRO) 10 mg tablet Take 1 tablet (10 mg total) by mouth daily 90 tablet 1    traZODone (DESYREL) 50 mg tablet Take 1 tablet (50 mg total) by mouth daily at bedtime 90 tablet 3     No current facility-administered medications on file prior to visit.     No Known Allergies    PHYSICAL EXAM:  /82 (BP Location: Right arm, Patient Position: Sitting, Cuff Size: Standard)   Pulse 79   Temp (!) 97 °F (36.1 °C) (Tympanic)   Ht 6' 1\" (1.854 m)   Wt 108 kg (237 lb)   SpO2 98%   BMI 31.27 kg/m²   GENERAL: NAD, AAO  HEENT: anicteric, OP clear, MMM  ABDOMEN: S/ND/NT, normoactive BS, no hepatomegaly, spleen not palpable  EXTREMITIES: no edema  SKIN: no rashes, no palmar erythema, no spider angiomata   NEURO: normal gait, no tremor, no asterixis     LABS/RADIOLOGY/ENDOSCOPY:  Lab Results   Component Value Date    WBC 4.67 01/09/2025    HGB 15.4 01/09/2025    HCT 45.2 01/09/2025     01/09/2025    GLUF 120 (H) 01/09/2025    BUN 15 01/09/2025    CREATININE 0.83 01/09/2025    K 4.1 01/09/2025     01/09/2025    CO2 31 01/09/2025    ALKPHOS 63 01/09/2025    ALT 56 (H) 01/09/2025    AST 51 (H) 01/09/2025    GLOB 3.3 06/02/2021    CALCIUM 9.7 01/09/2025    EGFR 110 01/09/2025    TRIG 41 09/18/2024    HDL 44 09/18/2024    PT 13.5 01/01/2022    INR 0.98 01/09/2025    PTT 38 (H) 03/03/2023     MRI abdomen (10/2024)    MELD 3.0: 6 at 1/9/2025 11:09 AM  MELD-Na: 6 at 1/9/2025 11:09 AM  Calculated from:  Serum Creatinine: 0.83 mg/dL (Using min of 1 mg/dL) at 1/9/2025 11:09 AM  Serum Sodium: 140 mmol/L (Using max of 137 mmol/L) at 1/9/2025 11:09 AM  Total Bilirubin: 0.76 mg/dL (Using min of 1 mg/dL) at 1/9/2025 11:09 AM  Serum Albumin: 4.8 g/dL (Using max of 3.5 g/dL) at 1/9/2025 11:09 AM  INR(ratio): 0.98 (Using min of 1) at 1/9/2025 11:09 AM  Age at listing (hypothetical): 40 years  Sex: Male at 1/9/2025 11:09 AM          "

## 2025-01-13 NOTE — PROGRESS NOTES
[facility] - Outpatient Consultation  Wenceslao Burrell 40 y.o. male MRN: [id number]  Encounter: [id number]    Name: Wenceslao Burrell      : 1985      MRN: [id number]  Encounter Provider: Adriana Shelby MD  Encounter Date: 2025   Encounter department: [facility] [place]  Reason for Referral: EtOH-related cirrhosis   Assessment & Plan  Alcoholic cirrhosis of liver without ascites (HCC)  40 y.o. male with history of HLD, HTN, CVA and EtOH hepatitis with EtOH cirrhosis. He has no acute liver specific complaints or clinical evidence of hepatic decompensation.      He was admitted to  in 2023 for cholestatic liver injury with AST predominance and liver synthetic dysfunction on outpatient labs for work-up of abdominal pain. He reported history of heavy EtOH consumption (two 40 oz daily) and was noted to have severe EtOH hepatitis with MELD-Na 26 and DF 38. He had an MRI abdomen which showed cirrhotic liver morphology with intraabdominal varices and large amount of ascites. He had an LVP which confirmed portal hypertensive ascites that was negative for SBP.  He had an EGD which showed grade 2 esophageal varices and was started on NSBB. He completed prednisolone with improvement of his liver chemistries.     He has been abstinent from his diagnosis and has an updated MELD (3.0) 6 with mildly elevated transaminases and normal live liver synthetic function. He had an US elastography which showed F4 disease despite prolonged abstinence which supports the diagnosis of cirrhosis but has clinically recompensated. He is undergoing MRI surveillance for follow up of liver lesion that was not visualized on his most recent study in 2024.     He is otherwise up-to-date with his routine cirrhosis health maintenance and will return to clinic in 6 months or sooner if needed. Discussed that if he remains clinically stable, can lengthen his follow up interval to 1  year. Thank you for the opportunity to consult in his care.     1. Decompensated cirrhosis: MELD (3.0) 6  - Etiology: EtOH in sustained remission      2. Ascites, euvolemic   - Consider starting diuretics if he develops recurrent ascites needing drainage   - He will continue a low sodium diet (< 2g per day) to help prevent fluid retention     3. Varices  - Last EGD showed grade 2 varices in 3/2023  - Continue carvedilol 6.25 mg BID for primary prophyaxis   - No further EGDs needed for screening      4. HCC  - No lesions on MRI in October 2024     5 Healthcare maintenance for patients with cirrhosis  -He was instructed to take no more than 2 grams of tylenol in 24 hours and no products containing NSAIDs, benzodiazapines, and narcotics.  -He was also instructed to avoid raw shellfish   -He should participate in daily exercise as to prevent loss of muscle mass  -He should abstain from all alcohol intake and was counseled on this.    -He is at risk for vitamin deficiencies and metabolic bone disease.   -Needs HBV vaccination   -Additionally, he should receive a yearly flu shot and the pneumonia vaccine through his primary care provider.     Orders:    MRI abdomen w wo contrast and mrcp; Future    CBC and Platelet; Future    Comprehensive metabolic panel; Future    Protime-INR; Future    AFP tumor marker; Future    Esophageal varices without bleeding, unspecified esophageal varices type (HCC)    Orders:    carvedilol (COREG) 6.25 mg tablet; Take 1 tablet (6.25 mg total) by mouth 2 (two) times a day with meals    CBC and Platelet; Future    Comprehensive metabolic panel; Future    Protime-INR; Future    AFP tumor marker; Future    Portal hypertension (HCC)             History of Present Illness   HPI  Wenceslao Burrell is a 40 y.o. male who presents with history of HLD, HTN, CVA and EtOH hepatitis who presents for follow up. He was last seen in June 2024.   History obtained from: patient    Interval events   -MRI in October  "2024 showed no liver lesion  - Last MELD 6  - Denies EtOH use      Extended liver history  He was admitted to  in early March 2023 for abnormal liver chemistries on outpatient labs for work-up of abdominal pain. He reported history of heavy EtOH consumption (two 40 oz daily x 10 years) and was noted to have severe EtOH hepatitis with MELD-Na 26 and DF 38. He had an MRI abdomen which showed cirrhotic liver morphology with intraabdominal varices and large amount of ascites. He was started on prednisolone and completed 28d course with improvement of his liver chemistries and updated MELD-Na 13. He has required intermittent LVPs with fluid studies consistent with portal hypertension and negative for SBP. He was not started on diuretics but did last require and LVP in April 2023 with removal of 4.2L. He had an EGD which showed grade 2 esophageal varices and was started on nadolol.      Since his diagnosis, he has been abstinent from EtOH. He states that he did not know of his liver disease prior to his hospitalization. He denies a prior history of DUI or work-related issues due to EtOH. He denies recreational drug use. He lives with his wife and has no children. He states that there is no EtOH in his home.      US elastography in November 2023 showed F4 disease    Review of Systems   Constitutional: Negative.    HENT: Negative.     Eyes: Negative.    Respiratory: Negative.     Cardiovascular: Negative.    Gastrointestinal: Negative.    Endocrine: Negative.    Genitourinary: Negative.    Musculoskeletal: Negative.    Allergic/Immunologic: Negative.    Hematological: Negative.    Psychiatric/Behavioral: Negative.          Objective   /82 (BP Location: Right arm, Patient Position: Sitting, Cuff Size: Standard)   Pulse 79   Temp (!) 97 °F (36.1 °C) (Tympanic)   Ht 6' 1\" (1.854 m)   Wt 108 kg (237 lb)   SpO2 98%   BMI 31.27 kg/m²      Physical Exam  Constitutional:       Appearance: Normal appearance. He is " normal weight.   HENT:      Head: Normocephalic and atraumatic.      Mouth/Throat:      Mouth: Mucous membranes are moist.      Pharynx: Oropharynx is clear.   Eyes:      Extraocular Movements: Extraocular movements intact.      Conjunctiva/sclera: Conjunctivae normal.      Pupils: Pupils are equal, round, and reactive to light.   Abdominal:      General: Abdomen is flat.      Palpations: Abdomen is soft.   Musculoskeletal:         General: Normal range of motion.      Cervical back: Normal range of motion.   Skin:     General: Skin is warm and dry.   Neurological:      General: No focal deficit present.      Mental Status: He is alert and oriented to person, place, and time.       Lab Results   Component Value Date    WBC 4.67 01/09/2025    HGB 15.4 01/09/2025    HCT 45.2 01/09/2025    MCV 89 01/09/2025     01/09/2025     Lab Results   Component Value Date    SODIUM 140 01/09/2025    K 4.1 01/09/2025     01/09/2025    CO2 31 01/09/2025    AGAP 5 01/09/2025    BUN 15 01/09/2025    CREATININE 0.83 01/09/2025    GLUC 147 (H) 04/20/2023    GLUF 120 (H) 01/09/2025    CALCIUM 9.7 01/09/2025    AST 51 (H) 01/09/2025    ALT 56 (H) 01/09/2025    ALKPHOS 63 01/09/2025    TP 7.4 01/09/2025    TBILI 0.76 01/09/2025    EGFR 110 01/09/2025     Lab Results   Component Value Date    INR 0.98 01/09/2025    INR 1.05 04/18/2024    INR 1.06 12/06/2023    PROTIME 13.7 01/09/2025    PROTIME 14.3 04/18/2024    PROTIME 14.4 12/06/2023     MELD 3.0: 6 at 1/9/2025 11:09 AM  MELD-Na: 6 at 1/9/2025 11:09 AM  Calculated from:  Serum Creatinine: 0.83 mg/dL (Using min of 1 mg/dL) at 1/9/2025 11:09 AM  Serum Sodium: 140 mmol/L (Using max of 137 mmol/L) at 1/9/2025 11:09 AM  Total Bilirubin: 0.76 mg/dL (Using min of 1 mg/dL) at 1/9/2025 11:09 AM  Serum Albumin: 4.8 g/dL (Using max of 3.5 g/dL) at 1/9/2025 11:09 AM  INR(ratio): 0.98 (Using min of 1) at 1/9/2025 11:09 AM  Age at listing (hypothetical): 40 years  Sex: Male at 1/9/2025  11:09 AM    MRI abdomen (10/2024)    1. Liver cirrhosis without suspicious liver lesions. Previously noted focus of arterial phase hyperenhancement in segment VIII is not visualized on the current examination, and likely represented transient perfusional change (or shunt).     LI-RADS Category: Negative.     Recommendation: Routine surveillance imaging (MRI or ultrasound) in 6 months.

## 2025-01-13 NOTE — ASSESSMENT & PLAN NOTE
Orders:    carvedilol (COREG) 6.25 mg tablet; Take 1 tablet (6.25 mg total) by mouth 2 (two) times a day with meals    CBC and Platelet; Future    Comprehensive metabolic panel; Future    Protime-INR; Future    AFP tumor marker; Future

## 2025-02-02 ENCOUNTER — RESULTS FOLLOW-UP (OUTPATIENT)
Age: 40
End: 2025-02-02

## 2025-04-04 ENCOUNTER — APPOINTMENT (OUTPATIENT)
Dept: LAB | Facility: HOSPITAL | Age: 40
End: 2025-04-04
Payer: COMMERCIAL

## 2025-04-04 DIAGNOSIS — K70.30 ALCOHOLIC CIRRHOSIS OF LIVER WITHOUT ASCITES (HCC): ICD-10-CM

## 2025-04-04 DIAGNOSIS — I85.00 ESOPHAGEAL VARICES WITHOUT BLEEDING, UNSPECIFIED ESOPHAGEAL VARICES TYPE (HCC): ICD-10-CM

## 2025-04-04 LAB
AFP-TM SERPL-MCNC: 1.83 NG/ML (ref 0–9)
ALBUMIN SERPL BCG-MCNC: 4.5 G/DL (ref 3.5–5)
ALP SERPL-CCNC: 64 U/L (ref 34–104)
ALT SERPL W P-5'-P-CCNC: 48 U/L (ref 7–52)
ANION GAP SERPL CALCULATED.3IONS-SCNC: 5 MMOL/L (ref 4–13)
AST SERPL W P-5'-P-CCNC: 43 U/L (ref 13–39)
BILIRUB SERPL-MCNC: 0.71 MG/DL (ref 0.2–1)
BUN SERPL-MCNC: 17 MG/DL (ref 5–25)
CALCIUM SERPL-MCNC: 9.4 MG/DL (ref 8.4–10.2)
CHLORIDE SERPL-SCNC: 106 MMOL/L (ref 96–108)
CO2 SERPL-SCNC: 27 MMOL/L (ref 21–32)
CREAT SERPL-MCNC: 0.81 MG/DL (ref 0.6–1.3)
ERYTHROCYTE [DISTWIDTH] IN BLOOD BY AUTOMATED COUNT: 12.3 % (ref 11.6–15.1)
GFR SERPL CREATININE-BSD FRML MDRD: 111 ML/MIN/1.73SQ M
GLUCOSE P FAST SERPL-MCNC: 108 MG/DL (ref 65–99)
HCT VFR BLD AUTO: 41.8 % (ref 36.5–49.3)
HGB BLD-MCNC: 14.5 G/DL (ref 12–17)
INR PPP: 0.94 (ref 0.85–1.19)
MCH RBC QN AUTO: 30.6 PG (ref 26.8–34.3)
MCHC RBC AUTO-ENTMCNC: 34.7 G/DL (ref 31.4–37.4)
MCV RBC AUTO: 88 FL (ref 82–98)
PLATELET # BLD AUTO: 124 THOUSANDS/UL (ref 149–390)
PMV BLD AUTO: 11.2 FL (ref 8.9–12.7)
POTASSIUM SERPL-SCNC: 4.3 MMOL/L (ref 3.5–5.3)
PROT SERPL-MCNC: 7.3 G/DL (ref 6.4–8.4)
PROTHROMBIN TIME: 13.3 SECONDS (ref 12.3–15)
RBC # BLD AUTO: 4.74 MILLION/UL (ref 3.88–5.62)
SODIUM SERPL-SCNC: 138 MMOL/L (ref 135–147)
WBC # BLD AUTO: 4.24 THOUSAND/UL (ref 4.31–10.16)

## 2025-04-04 PROCEDURE — 85610 PROTHROMBIN TIME: CPT

## 2025-04-04 PROCEDURE — 36415 COLL VENOUS BLD VENIPUNCTURE: CPT

## 2025-04-04 PROCEDURE — 82105 ALPHA-FETOPROTEIN SERUM: CPT

## 2025-04-04 PROCEDURE — 85027 COMPLETE CBC AUTOMATED: CPT

## 2025-04-04 PROCEDURE — 80053 COMPREHEN METABOLIC PANEL: CPT

## 2025-04-07 ENCOUNTER — RESULTS FOLLOW-UP (OUTPATIENT)
Age: 40
End: 2025-04-07

## 2025-04-11 ENCOUNTER — HOSPITAL ENCOUNTER (OUTPATIENT)
Dept: MRI IMAGING | Facility: HOSPITAL | Age: 40
End: 2025-04-11
Attending: STUDENT IN AN ORGANIZED HEALTH CARE EDUCATION/TRAINING PROGRAM
Payer: COMMERCIAL

## 2025-04-11 DIAGNOSIS — K70.30 ALCOHOLIC CIRRHOSIS OF LIVER WITHOUT ASCITES (HCC): ICD-10-CM

## 2025-04-11 PROCEDURE — A9585 GADOBUTROL INJECTION: HCPCS | Performed by: STUDENT IN AN ORGANIZED HEALTH CARE EDUCATION/TRAINING PROGRAM

## 2025-04-11 PROCEDURE — 74183 MRI ABD W/O CNTR FLWD CNTR: CPT

## 2025-04-11 RX ORDER — GADOBUTROL 604.72 MG/ML
10 INJECTION INTRAVENOUS
Status: COMPLETED | OUTPATIENT
Start: 2025-04-11 | End: 2025-04-11

## 2025-04-11 RX ADMIN — GADOBUTROL 10 ML: 604.72 INJECTION INTRAVENOUS at 10:49

## 2025-04-30 DIAGNOSIS — E78.2 MIXED HYPERLIPIDEMIA: ICD-10-CM

## 2025-05-01 RX ORDER — ATORVASTATIN CALCIUM 40 MG/1
40 TABLET, FILM COATED ORAL DAILY
Qty: 90 TABLET | Refills: 1 | Status: SHIPPED | OUTPATIENT
Start: 2025-05-01

## 2025-05-06 ENCOUNTER — OFFICE VISIT (OUTPATIENT)
Dept: INTERNAL MEDICINE CLINIC | Facility: CLINIC | Age: 40
End: 2025-05-06
Payer: COMMERCIAL

## 2025-05-06 VITALS
HEART RATE: 83 BPM | BODY MASS INDEX: 31.41 KG/M2 | SYSTOLIC BLOOD PRESSURE: 120 MMHG | HEIGHT: 73 IN | RESPIRATION RATE: 18 BRPM | OXYGEN SATURATION: 97 % | WEIGHT: 237 LBS | DIASTOLIC BLOOD PRESSURE: 82 MMHG

## 2025-05-06 DIAGNOSIS — R53.1 LEFT-SIDED WEAKNESS: ICD-10-CM

## 2025-05-06 DIAGNOSIS — F10.20 ALCOHOLISM (HCC): Primary | ICD-10-CM

## 2025-05-06 DIAGNOSIS — Z86.73 HISTORY OF CEREBELLAR STROKE: ICD-10-CM

## 2025-05-06 PROCEDURE — 99213 OFFICE O/P EST LOW 20 MIN: CPT | Performed by: INTERNAL MEDICINE

## 2025-05-06 NOTE — ASSESSMENT & PLAN NOTE
H/o alcoholism and continues his sobriety.    H/o alcoholic seizure in 2022.     Forms completed today. 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

## 2025-05-06 NOTE — PROGRESS NOTES
"Name: Wenceslao Burrell      : 1985      MRN: [id number]  Encounter Provider: Faye Lebron MD  Encounter Date: 2025   Encounter department: [facility] [place]  :  Assessment & Plan  Alcoholism (HCC)  H/o alcoholism and continues his sobriety.    H/o alcoholic seizure in .     Forms completed today. 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         History of cerebellar stroke  4-5 years ago, Some residual weakness to left side           Left-sided weakness  Chronic. Mild.               Depression Screening and Follow-up Plan: Patient's depression screening was positive with a PHQ-9 score of 5.   Patient with underlying depression and was advised to continue current medications as prescribed.     Tobacco Cessation Counseling: Tobacco cessation counseling was provided. The patient is sincerely urged to quit consumption of tobacco. He is not ready to quit tobacco. Medication options discussed.       History of Present Illness   Here with forms to complete.      Review of Systems   Constitutional:  Negative for chills and fever.   HENT:  Negative for ear pain and sore throat.    Eyes:  Negative for pain and visual disturbance.   Respiratory:  Negative for cough and shortness of breath.    Cardiovascular:  Negative for chest pain and palpitations.   Gastrointestinal:  Negative for abdominal pain and vomiting.   Genitourinary:  Negative for dysuria and hematuria.   Musculoskeletal:  Negative for arthralgias and back pain.   Skin:  Negative for color change and rash.   Neurological:  Negative for seizures and syncope.   All other systems reviewed and are negative.      Objective   /82 (BP Location: Left arm, Patient Position: Sitting, Cuff Size: Adult)   Pulse 83   Resp 18   Ht 6' 1\" (1.854 m)   Wt 108 kg (237 lb)   SpO2 97%   BMI 31.27 kg/m²      Physical Exam  Vitals and nursing note reviewed.   Constitutional:       General: He is not in " acute distress.     Appearance: He is well-developed.   HENT:      Head: Normocephalic and atraumatic.   Eyes:      Conjunctiva/sclera: Conjunctivae normal.   Cardiovascular:      Rate and Rhythm: Normal rate and regular rhythm.      Heart sounds: No murmur heard.  Pulmonary:      Effort: Pulmonary effort is normal. No respiratory distress.      Breath sounds: Normal breath sounds.   Abdominal:      Palpations: Abdomen is soft.      Tenderness: There is no abdominal tenderness.   Musculoskeletal:         General: No swelling.      Cervical back: Neck supple.   Skin:     General: Skin is warm and dry.      Capillary Refill: Capillary refill takes less than 2 seconds.   Neurological:      Mental Status: He is alert.   Psychiatric:         Mood and Affect: Mood normal.

## 2025-05-07 ENCOUNTER — TELEPHONE (OUTPATIENT)
Dept: INTERNAL MEDICINE CLINIC | Facility: CLINIC | Age: 40
End: 2025-05-07

## 2025-05-07 NOTE — TELEPHONE ENCOUNTER
Patient was here yesterday for visit. He had forms filled out and there was one he didn't have with him. He dropped off medical response-alcoholism form to be filled out.     Please call patient when done at [phone] and he will  original.     Placed in MA's bin

## 2025-05-07 NOTE — TELEPHONE ENCOUNTER
Form completed. Patient advised. He will  the form. Original left in  accordion and a copy was made for his chart.

## 2025-06-03 ENCOUNTER — TELEPHONE (OUTPATIENT)
Age: 40
End: 2025-06-03

## 2025-06-03 NOTE — TELEPHONE ENCOUNTER
Called and spoke to patient regarding having to move appt in [place] to [address] [place] [address] due to change in provider schedule. Patient wanted to move to there office to stay with Dr. Shelby

## 2025-06-17 ENCOUNTER — APPOINTMENT (OUTPATIENT)
Dept: LAB | Facility: HOSPITAL | Age: 40
End: 2025-06-17
Payer: COMMERCIAL

## 2025-06-17 DIAGNOSIS — E78.2 MIXED HYPERLIPIDEMIA: ICD-10-CM

## 2025-06-17 DIAGNOSIS — I85.00 ESOPHAGEAL VARICES WITHOUT BLEEDING, UNSPECIFIED ESOPHAGEAL VARICES TYPE (HCC): ICD-10-CM

## 2025-06-17 DIAGNOSIS — F32.A DEPRESSION, UNSPECIFIED DEPRESSION TYPE: ICD-10-CM

## 2025-06-17 DIAGNOSIS — F10.20 ALCOHOLISM (HCC): ICD-10-CM

## 2025-06-17 DIAGNOSIS — R73.01 IMPAIRED FASTING GLUCOSE: ICD-10-CM

## 2025-06-17 LAB
ALBUMIN SERPL BCG-MCNC: 4.6 G/DL (ref 3.5–5)
ALP SERPL-CCNC: 69 U/L (ref 34–104)
ALT SERPL W P-5'-P-CCNC: 50 U/L (ref 7–52)
ANION GAP SERPL CALCULATED.3IONS-SCNC: 7 MMOL/L (ref 4–13)
AST SERPL W P-5'-P-CCNC: 46 U/L (ref 13–39)
BASOPHILS # BLD AUTO: 0.04 THOUSANDS/ÂΜL (ref 0–0.1)
BASOPHILS NFR BLD AUTO: 1 % (ref 0–1)
BILIRUB SERPL-MCNC: 0.66 MG/DL (ref 0.2–1)
BUN SERPL-MCNC: 16 MG/DL (ref 5–25)
CALCIUM SERPL-MCNC: 9.1 MG/DL (ref 8.4–10.2)
CHLORIDE SERPL-SCNC: 104 MMOL/L (ref 96–108)
CHOLEST SERPL-MCNC: 110 MG/DL (ref ?–200)
CO2 SERPL-SCNC: 26 MMOL/L (ref 21–32)
CREAT SERPL-MCNC: 0.93 MG/DL (ref 0.6–1.3)
EOSINOPHIL # BLD AUTO: 0.05 THOUSAND/ÂΜL (ref 0–0.61)
EOSINOPHIL NFR BLD AUTO: 1 % (ref 0–6)
ERYTHROCYTE [DISTWIDTH] IN BLOOD BY AUTOMATED COUNT: 12.9 % (ref 11.6–15.1)
EST. AVERAGE GLUCOSE BLD GHB EST-MCNC: 97 MG/DL
GFR SERPL CREATININE-BSD FRML MDRD: 102 ML/MIN/1.73SQ M
GLUCOSE P FAST SERPL-MCNC: 104 MG/DL (ref 65–99)
HBA1C MFR BLD: 5 %
HCT VFR BLD AUTO: 45.5 % (ref 36.5–49.3)
HDLC SERPL-MCNC: 53 MG/DL
HGB BLD-MCNC: 15.4 G/DL (ref 12–17)
IMM GRANULOCYTES # BLD AUTO: 0.01 THOUSAND/UL (ref 0–0.2)
IMM GRANULOCYTES NFR BLD AUTO: 0 % (ref 0–2)
LDLC SERPL CALC-MCNC: 37 MG/DL (ref 0–100)
LYMPHOCYTES # BLD AUTO: 1.75 THOUSANDS/ÂΜL (ref 0.6–4.47)
LYMPHOCYTES NFR BLD AUTO: 31 % (ref 14–44)
MCH RBC QN AUTO: 29.8 PG (ref 26.8–34.3)
MCHC RBC AUTO-ENTMCNC: 33.8 G/DL (ref 31.4–37.4)
MCV RBC AUTO: 88 FL (ref 82–98)
MONOCYTES # BLD AUTO: 0.4 THOUSAND/ÂΜL (ref 0.17–1.22)
MONOCYTES NFR BLD AUTO: 7 % (ref 4–12)
NEUTROPHILS # BLD AUTO: 3.48 THOUSANDS/ÂΜL (ref 1.85–7.62)
NEUTS SEG NFR BLD AUTO: 60 % (ref 43–75)
NRBC BLD AUTO-RTO: 0 /100 WBCS
PLATELET # BLD AUTO: 155 THOUSANDS/UL (ref 149–390)
PMV BLD AUTO: 11.4 FL (ref 8.9–12.7)
POTASSIUM SERPL-SCNC: 4.1 MMOL/L (ref 3.5–5.3)
PROT SERPL-MCNC: 7.8 G/DL (ref 6.4–8.4)
RBC # BLD AUTO: 5.16 MILLION/UL (ref 3.88–5.62)
SODIUM SERPL-SCNC: 137 MMOL/L (ref 135–147)
TRIGL SERPL-MCNC: 102 MG/DL (ref ?–150)
WBC # BLD AUTO: 5.73 THOUSAND/UL (ref 4.31–10.16)

## 2025-06-17 PROCEDURE — 80061 LIPID PANEL: CPT

## 2025-06-17 PROCEDURE — 80053 COMPREHEN METABOLIC PANEL: CPT

## 2025-06-17 PROCEDURE — 83036 HEMOGLOBIN GLYCOSYLATED A1C: CPT

## 2025-06-17 PROCEDURE — 85025 COMPLETE CBC W/AUTO DIFF WBC: CPT

## 2025-06-17 PROCEDURE — 36415 COLL VENOUS BLD VENIPUNCTURE: CPT

## 2025-06-24 DIAGNOSIS — F51.04 PSYCHOPHYSIOLOGICAL INSOMNIA: ICD-10-CM

## 2025-06-24 DIAGNOSIS — F32.A DEPRESSION, UNSPECIFIED DEPRESSION TYPE: ICD-10-CM

## 2025-06-25 ENCOUNTER — OFFICE VISIT (OUTPATIENT)
Dept: INTERNAL MEDICINE CLINIC | Facility: CLINIC | Age: 40
End: 2025-06-25
Payer: COMMERCIAL

## 2025-06-25 VITALS
RESPIRATION RATE: 18 BRPM | HEART RATE: 80 BPM | DIASTOLIC BLOOD PRESSURE: 84 MMHG | HEIGHT: 73 IN | BODY MASS INDEX: 31.7 KG/M2 | WEIGHT: 239.2 LBS | SYSTOLIC BLOOD PRESSURE: 122 MMHG | OXYGEN SATURATION: 96 %

## 2025-06-25 DIAGNOSIS — Z00.00 ANNUAL PHYSICAL EXAM: Primary | ICD-10-CM

## 2025-06-25 PROCEDURE — 99396 PREV VISIT EST AGE 40-64: CPT | Performed by: INTERNAL MEDICINE

## 2025-06-25 NOTE — PROGRESS NOTES
Adult Annual Physical  Name: Wenceslao Burrell      : 1985      MRN: [id number]  Encounter Provider: Faye Lebron MD  Encounter Date: 2025   Encounter department: [facility] [place]    :  Assessment & Plan  Annual physical exam  Completed. Denies concerns.           Preventive Screenings:  - Diabetes Screening: screening up-to-date  - Cholesterol Screening: has hyperlipidemia and screening up-to-date   - Hepatitis C screening: screening up-to-date   - HIV screening: screening up-to-date   - Colon cancer screening: screening not indicated   - Lung cancer screening: screening not indicated   - Prostate cancer screening: screening not indicated     Immunizations:  - Immunizations due: Tdap and Hepatitis A    Counseling/Anticipatory Guidance:    - Diet: discussed recommendations for a healthy/well-balanced diet.   - Exercise: the importance of regular exercise/physical activity was discussed. Recommend exercise 3-5 times per week for at least 30 minutes.   - Injury prevention: discussed safety/seat belts, safety helmets, smoke detectors, carbon monoxide detectors, and smoking near bedding or upholstery.       Depression Screening and Follow-up Plan: Patient was screened for depression during today's encounter. They screened negative with a PHQ-9 score of 0.      Tobacco Cessation Counseling: Smokeless tobacco-- quit          History of Present Illness     Adult Annual Physical:  Patient presents for annual physical.     Diet and Physical Activity:  - Diet/Nutrition: no special diet.  - Exercise: no formal exercise and strength training exercises.    Depression Screening:    - PHQ-9 Score: 0    General Health:  - Sleep: sleeps well.  - Hearing: normal hearing bilateral ears.  - Vision: no vision problems, wears glasses and most recent eye exam > 1 year ago.  - Dental: regular dental visits.     Health:  - History of STDs: no.   - Urinary symptoms: none.     Advanced Care  "Planning:  - Has an advanced directive?: no    - Has a durable medical POA?: no    - ACP document given to patient?: no      Review of Systems   Constitutional:  Negative for chills and fever.   HENT:  Negative for ear pain and sore throat.    Eyes:  Negative for pain and visual disturbance.   Respiratory:  Negative for cough and shortness of breath.    Cardiovascular:  Negative for chest pain and palpitations.   Gastrointestinal:  Negative for abdominal pain and vomiting.   Genitourinary:  Negative for dysuria and hematuria.   Musculoskeletal:  Negative for arthralgias and back pain.   Skin:  Negative for color change and rash.   Neurological:  Negative for seizures and syncope.   All other systems reviewed and are negative.    Past Medical History   Past Medical History[1]  Past Surgical History[2]  Family History[3]   reports that he is a non-smoker but has been exposed to tobacco smoke. He has quit using smokeless tobacco.  His smokeless tobacco use included snuff. He reports that he does not currently use alcohol after a past usage of about 6.0 standard drinks of alcohol per week. He reports that he does not use drugs.  Current Outpatient Medications   Medication Instructions    atorvastatin (LIPITOR) 40 mg, Oral, Daily    carvedilol (COREG) 6.25 mg, Oral, 2 times daily with meals    escitalopram (LEXAPRO) 10 mg, Oral, Daily    traZODone (DESYREL) 50 mg, Oral, Daily at bedtime   Allergies[4]     Objective   /84 (BP Location: Left arm, Patient Position: Sitting, Cuff Size: Adult)   Pulse 80   Resp 18   Ht 6' 1\" (1.854 m)   Wt 109 kg (239 lb 3.2 oz)   SpO2 96%   BMI 31.56 kg/m²     Physical Exam  Vitals and nursing note reviewed.   Constitutional:       General: He is not in acute distress.     Appearance: He is well-developed.   HENT:      Head: Normocephalic and atraumatic.     Eyes:      Conjunctiva/sclera: Conjunctivae normal.       Cardiovascular:      Rate and Rhythm: Normal rate and regular " rhythm.      Heart sounds: No murmur heard.  Pulmonary:      Effort: Pulmonary effort is normal. No respiratory distress.      Breath sounds: Normal breath sounds.   Abdominal:      Palpations: Abdomen is soft.      Tenderness: There is no abdominal tenderness.     Musculoskeletal:         General: No swelling.      Cervical back: Neck supple.     Skin:     General: Skin is warm and dry.      Capillary Refill: Capillary refill takes less than 2 seconds.     Neurological:      Mental Status: He is alert.     Psychiatric:         Mood and Affect: Mood normal.              [1]   Past Medical History:  Diagnosis Date    Alcoholism (HCC)     Anxiety     Depression     Fatty liver     Hyperlipidemia     Hypertension     Liver disease     Stroke (HCC)     Substance abuse (HCC)    [2]   Past Surgical History:  Procedure Laterality Date    IR PARACENTESIS  3/3/2023    IR PARACENTESIS  3/6/2023    IR PARACENTESIS  3/17/2023    IR PARACENTESIS  4/10/2023   [3]   Family History  Problem Relation Name Age of Onset    Lymphoma Mother Estrellita Burrell     Heart attack Mother Estrellita Burrell     Hypertension Mother Estrellita Ashanti     Heart disease Mother Estrellita Ashanti     Cancer Mother Estrellita Burrell     Hypertension Father     [4] No Known Allergies

## 2025-06-26 RX ORDER — ESCITALOPRAM OXALATE 10 MG/1
10 TABLET ORAL DAILY
Qty: 90 TABLET | Refills: 1 | Status: SHIPPED | OUTPATIENT
Start: 2025-06-26 | End: 2025-12-23

## 2025-06-26 RX ORDER — TRAZODONE HYDROCHLORIDE 50 MG/1
50 TABLET ORAL
Qty: 90 TABLET | Refills: 1 | Status: SHIPPED | OUTPATIENT
Start: 2025-06-26